# Patient Record
Sex: MALE | Race: WHITE | NOT HISPANIC OR LATINO | Employment: OTHER | URBAN - NONMETROPOLITAN AREA
[De-identification: names, ages, dates, MRNs, and addresses within clinical notes are randomized per-mention and may not be internally consistent; named-entity substitution may affect disease eponyms.]

---

## 2019-10-17 ENCOUNTER — OFFICE VISIT CONVERTED (OUTPATIENT)
Dept: FAMILY MEDICINE CLINIC | Age: 54
End: 2019-10-17
Attending: FAMILY MEDICINE

## 2019-10-17 ENCOUNTER — HOSPITAL ENCOUNTER (OUTPATIENT)
Dept: OTHER | Facility: HOSPITAL | Age: 54
Discharge: HOME | End: 2019-10-17
Attending: FAMILY MEDICINE

## 2019-10-17 LAB
ALBUMIN SERPL-MCNC: 4.7 G/DL (ref 3.5–5)
ALBUMIN/GLOB SERPL: 2 {RATIO} (ref 1.4–2.6)
ALP SERPL-CCNC: 50 U/L (ref 56–119)
ALT SERPL-CCNC: 16 U/L (ref 10–40)
ANION GAP SERPL CALC-SCNC: 18 MMOL/L (ref 8–19)
AST SERPL-CCNC: 20 U/L (ref 15–50)
BASOPHILS # BLD MANUAL: 0.02 10*3/UL (ref 0–0.2)
BASOPHILS NFR BLD MANUAL: 0.4 % (ref 0–3)
BILIRUB SERPL-MCNC: 1.01 MG/DL (ref 0.2–1.3)
BUN SERPL-MCNC: 15 MG/DL (ref 5–25)
BUN/CREAT SERPL: 16 {RATIO} (ref 6–20)
CALCIUM SERPL-MCNC: 9.8 MG/DL (ref 8.7–10.4)
CHLORIDE SERPL-SCNC: 102 MMOL/L (ref 99–111)
CHOLEST SERPL-MCNC: 180 MG/DL (ref 107–200)
CHOLEST/HDLC SERPL: 2.5 {RATIO} (ref 3–6)
CONV CO2: 26 MMOL/L (ref 22–32)
CONV TOTAL PROTEIN: 7.1 G/DL (ref 6.3–8.2)
CREAT UR-MCNC: 0.96 MG/DL (ref 0.7–1.2)
DEPRECATED RDW RBC AUTO: 40.1 FL
EOSINOPHIL # BLD MANUAL: 0.11 10*3/UL (ref 0–0.7)
EOSINOPHIL NFR BLD MANUAL: 2.3 % (ref 0–7)
ERYTHROCYTE [DISTWIDTH] IN BLOOD BY AUTOMATED COUNT: 12.3 % (ref 11.5–14.5)
GFR SERPLBLD BASED ON 1.73 SQ M-ARVRAT: >60 ML/MIN/{1.73_M2}
GLOBULIN UR ELPH-MCNC: 2.4 G/DL (ref 2–3.5)
GLUCOSE SERPL-MCNC: 111 MG/DL (ref 70–99)
GRANS (ABSOLUTE): 3.24 10*3/UL (ref 2–8)
GRANS: 67.2 % (ref 30–85)
HBA1C MFR BLD: 14.7 G/DL (ref 14–18)
HCT VFR BLD AUTO: 43.8 % (ref 42–52)
HDLC SERPL-MCNC: 73 MG/DL (ref 40–60)
IMM GRANULOCYTES # BLD: 0 10*3/UL (ref 0–0.54)
IMM GRANULOCYTES NFR BLD: 0 % (ref 0–0.43)
LDLC SERPL CALC-MCNC: 96 MG/DL (ref 70–100)
LYMPHOCYTES # BLD MANUAL: 1.09 10*3/UL (ref 1–5)
LYMPHOCYTES NFR BLD MANUAL: 7.5 % (ref 3–10)
MCH RBC QN AUTO: 29.6 PG (ref 27–31)
MCHC RBC AUTO-ENTMCNC: 33.6 G/DL (ref 33–37)
MCV RBC AUTO: 88.1 FL (ref 80–96)
MONOCYTES # BLD AUTO: 0.36 10*3/UL (ref 0.2–1.2)
OSMOLALITY SERPL CALC.SUM OF ELEC: 294 MOSM/KG (ref 273–304)
PLATELET # BLD AUTO: 122 10*3/UL (ref 130–400)
PMV BLD AUTO: 11.2 FL (ref 7.4–10.4)
POTASSIUM SERPL-SCNC: 4.6 MMOL/L (ref 3.5–5.3)
RBC # BLD AUTO: 4.97 10*6/UL (ref 4.7–6.1)
SODIUM SERPL-SCNC: 141 MMOL/L (ref 135–147)
TRIGL SERPL-MCNC: 57 MG/DL (ref 40–150)
TSH SERPL-ACNC: 3.29 M[IU]/L (ref 0.27–4.2)
VARIANT LYMPHS NFR BLD MANUAL: 22.6 % (ref 20–45)
VLDLC SERPL-MCNC: 11 MG/DL (ref 5–37)
WBC # BLD AUTO: 4.82 10*3/UL (ref 4.8–10.8)

## 2019-10-18 LAB
EST. AVERAGE GLUCOSE BLD GHB EST-MCNC: 103 MG/DL
HBA1C MFR BLD: 5.2 % (ref 3.5–5.7)

## 2020-01-13 ENCOUNTER — HOSPITAL ENCOUNTER (OUTPATIENT)
Dept: OTHER | Facility: HOSPITAL | Age: 55
Discharge: HOME | End: 2020-01-13
Attending: FAMILY MEDICINE

## 2020-01-13 LAB
ERYTHROCYTE [DISTWIDTH] IN BLOOD BY AUTOMATED COUNT: 12.1 % (ref 11.6–14.4)
HCT VFR BLD AUTO: 42.7 % (ref 42–52)
HGB BLD-MCNC: 14.4 G/DL (ref 14–18)
MCH RBC QN AUTO: 29.4 PG (ref 27–31)
MCHC RBC AUTO-ENTMCNC: 33.7 G/DL (ref 33–37)
MCV RBC AUTO: 87.3 FL (ref 80–96)
PLATELET # BLD AUTO: 144 10*3/UL (ref 130–400)
PMV BLD AUTO: 11.6 FL (ref 9.4–12.4)
RBC # BLD AUTO: 4.89 10*6/UL (ref 4.7–6.1)
WBC # BLD AUTO: 6.24 10*3/UL (ref 4.8–10.8)

## 2020-04-21 ENCOUNTER — OFFICE VISIT CONVERTED (OUTPATIENT)
Dept: FAMILY MEDICINE CLINIC | Age: 55
End: 2020-04-21
Attending: FAMILY MEDICINE

## 2020-04-22 ENCOUNTER — HOSPITAL ENCOUNTER (OUTPATIENT)
Dept: OTHER | Facility: HOSPITAL | Age: 55
Discharge: HOME | End: 2020-04-22
Attending: FAMILY MEDICINE

## 2020-04-22 LAB
ALBUMIN SERPL-MCNC: 4.5 G/DL (ref 3.5–5)
ALBUMIN/GLOB SERPL: 1.8 {RATIO} (ref 1.4–2.6)
ALP SERPL-CCNC: 57 U/L (ref 56–119)
ALT SERPL-CCNC: 20 U/L (ref 10–40)
ANION GAP SERPL CALC-SCNC: 18 MMOL/L (ref 8–19)
AST SERPL-CCNC: 20 U/L (ref 15–50)
BILIRUB SERPL-MCNC: 0.46 MG/DL (ref 0.2–1.3)
BUN SERPL-MCNC: 14 MG/DL (ref 5–25)
BUN/CREAT SERPL: 12 {RATIO} (ref 6–20)
CALCIUM SERPL-MCNC: 10.2 MG/DL (ref 8.7–10.4)
CHLORIDE SERPL-SCNC: 101 MMOL/L (ref 99–111)
CONV CO2: 27 MMOL/L (ref 22–32)
CONV TOTAL PROTEIN: 7 G/DL (ref 6.3–8.2)
CREAT UR-MCNC: 1.14 MG/DL (ref 0.7–1.2)
ERYTHROCYTE [DISTWIDTH] IN BLOOD BY AUTOMATED COUNT: 11.9 % (ref 11.5–14.5)
GFR SERPLBLD BASED ON 1.73 SQ M-ARVRAT: >60 ML/MIN/{1.73_M2}
GLOBULIN UR ELPH-MCNC: 2.5 G/DL (ref 2–3.5)
GLUCOSE SERPL-MCNC: 111 MG/DL (ref 70–99)
HBA1C MFR BLD: 15 G/DL (ref 14–18)
HCT VFR BLD AUTO: 44.5 % (ref 42–52)
MAGNESIUM SERPL-MCNC: 1.88 MG/DL (ref 1.6–2.3)
MCH RBC QN AUTO: 29.6 PG (ref 27–31)
MCHC RBC AUTO-ENTMCNC: 33.7 G/DL (ref 33–37)
MCV RBC AUTO: 87.9 FL (ref 80–96)
OSMOLALITY SERPL CALC.SUM OF ELEC: 293 MOSM/KG (ref 273–304)
PLATELET # BLD AUTO: 139 10*3/UL (ref 130–400)
PMV BLD AUTO: 10.6 FL (ref 7.4–10.4)
POTASSIUM SERPL-SCNC: 4.7 MMOL/L (ref 3.5–5.3)
RBC # BLD AUTO: 5.06 10*6/UL (ref 4.7–6.1)
SODIUM SERPL-SCNC: 141 MMOL/L (ref 135–147)
TSH SERPL-ACNC: 3.53 M[IU]/L (ref 0.27–4.2)
WBC # BLD AUTO: 5.11 10*3/UL (ref 4.8–10.8)

## 2020-04-24 ENCOUNTER — CONVERSION ENCOUNTER (OUTPATIENT)
Dept: CARDIOLOGY | Facility: CLINIC | Age: 55
End: 2020-04-24
Attending: SPECIALIST

## 2020-05-20 ENCOUNTER — CONVERSION ENCOUNTER (OUTPATIENT)
Dept: OTHER | Facility: HOSPITAL | Age: 55
End: 2020-05-20

## 2020-05-20 ENCOUNTER — OFFICE VISIT CONVERTED (OUTPATIENT)
Dept: CARDIOLOGY | Facility: CLINIC | Age: 55
End: 2020-05-20
Attending: INTERNAL MEDICINE

## 2020-05-28 ENCOUNTER — CONVERSION ENCOUNTER (OUTPATIENT)
Dept: CARDIOLOGY | Facility: CLINIC | Age: 55
End: 2020-05-28
Attending: INTERNAL MEDICINE

## 2020-07-08 ENCOUNTER — CONVERSION ENCOUNTER (OUTPATIENT)
Dept: CARDIOLOGY | Facility: CLINIC | Age: 55
End: 2020-07-08
Attending: INTERNAL MEDICINE

## 2020-08-27 ENCOUNTER — HOSPITAL ENCOUNTER (OUTPATIENT)
Dept: OTHER | Facility: HOSPITAL | Age: 55
Discharge: HOME | End: 2020-08-27
Attending: FAMILY MEDICINE

## 2020-08-27 ENCOUNTER — OFFICE VISIT CONVERTED (OUTPATIENT)
Dept: FAMILY MEDICINE CLINIC | Age: 55
End: 2020-08-27
Attending: FAMILY MEDICINE

## 2020-09-10 ENCOUNTER — HOSPITAL ENCOUNTER (OUTPATIENT)
Dept: PHYSICAL THERAPY | Facility: CLINIC | Age: 55
Setting detail: RECURRING SERIES
Discharge: HOME | End: 2020-10-13
Attending: FAMILY MEDICINE

## 2021-04-12 ENCOUNTER — OFFICE VISIT CONVERTED (OUTPATIENT)
Dept: FAMILY MEDICINE CLINIC | Age: 56
End: 2021-04-12
Attending: FAMILY MEDICINE

## 2021-04-14 ENCOUNTER — HOSPITAL ENCOUNTER (OUTPATIENT)
Dept: OTHER | Facility: HOSPITAL | Age: 56
Discharge: HOME | End: 2021-04-14
Attending: FAMILY MEDICINE

## 2021-04-14 LAB
ALBUMIN SERPL-MCNC: 4.4 G/DL (ref 3.5–5)
ALBUMIN/GLOB SERPL: 1.7 {RATIO} (ref 1.4–2.6)
ALP SERPL-CCNC: 53 U/L (ref 56–119)
ALT SERPL-CCNC: 22 U/L (ref 10–40)
ANION GAP SERPL CALC-SCNC: 17 MMOL/L (ref 8–19)
AST SERPL-CCNC: 22 U/L (ref 15–50)
BASOPHILS # BLD MANUAL: 0.01 10*3/UL (ref 0–0.2)
BASOPHILS NFR BLD MANUAL: 0.2 % (ref 0–3)
BILIRUB SERPL-MCNC: 0.77 MG/DL (ref 0.2–1.3)
BUN SERPL-MCNC: 17 MG/DL (ref 5–25)
BUN/CREAT SERPL: 17 {RATIO} (ref 6–20)
CALCIUM SERPL-MCNC: 9.4 MG/DL (ref 8.7–10.4)
CHLORIDE SERPL-SCNC: 101 MMOL/L (ref 99–111)
CHOLEST SERPL-MCNC: 206 MG/DL (ref 107–200)
CHOLEST/HDLC SERPL: 2.8 {RATIO} (ref 3–6)
CONV CO2: 25 MMOL/L (ref 22–32)
CONV TOTAL PROTEIN: 7 G/DL (ref 6.3–8.2)
CREAT UR-MCNC: 0.99 MG/DL (ref 0.7–1.2)
DEPRECATED RDW RBC AUTO: 40 FL
EOSINOPHIL # BLD MANUAL: 0.15 10*3/UL (ref 0–0.7)
EOSINOPHIL NFR BLD MANUAL: 3 % (ref 0–7)
ERYTHROCYTE [DISTWIDTH] IN BLOOD BY AUTOMATED COUNT: 12.4 % (ref 11.5–14.5)
GFR SERPLBLD BASED ON 1.73 SQ M-ARVRAT: >60 ML/MIN/{1.73_M2}
GLOBULIN UR ELPH-MCNC: 2.6 G/DL (ref 2–3.5)
GLUCOSE SERPL-MCNC: 107 MG/DL (ref 70–99)
GRANS (ABSOLUTE): 3 10*3/UL (ref 2–8)
GRANS: 59.5 % (ref 30–85)
HBA1C MFR BLD: 15.3 G/DL (ref 14–18)
HCT VFR BLD AUTO: 45 % (ref 42–52)
HDLC SERPL-MCNC: 73 MG/DL (ref 40–60)
IMM GRANULOCYTES # BLD: 0 10*3/UL (ref 0–0.54)
IMM GRANULOCYTES NFR BLD: 0 % (ref 0–0.43)
LDLC SERPL CALC-MCNC: 123 MG/DL (ref 70–100)
LYMPHOCYTES # BLD MANUAL: 1.46 10*3/UL (ref 1–5)
LYMPHOCYTES NFR BLD MANUAL: 8.3 % (ref 3–10)
MCH RBC QN AUTO: 30.1 PG (ref 27–31)
MCHC RBC AUTO-ENTMCNC: 34 G/DL (ref 33–37)
MCV RBC AUTO: 88.6 FL (ref 80–96)
MONOCYTES # BLD AUTO: 0.42 10*3/UL (ref 0.2–1.2)
OSMOLALITY SERPL CALC.SUM OF ELEC: 290 MOSM/KG (ref 273–304)
PLATELET # BLD AUTO: 137 10*3/UL (ref 130–400)
PMV BLD AUTO: 11.1 FL (ref 7.4–10.4)
POTASSIUM SERPL-SCNC: 4.4 MMOL/L (ref 3.5–5.3)
RBC # BLD AUTO: 5.08 10*6/UL (ref 4.7–6.1)
SODIUM SERPL-SCNC: 139 MMOL/L (ref 135–147)
TRIGL SERPL-MCNC: 52 MG/DL (ref 40–150)
TSH SERPL-ACNC: 4.14 M[IU]/L (ref 0.27–4.2)
VARIANT LYMPHS NFR BLD MANUAL: 29 % (ref 20–45)
VLDLC SERPL-MCNC: 10 MG/DL (ref 5–37)
WBC # BLD AUTO: 5.04 10*3/UL (ref 4.8–10.8)

## 2021-04-15 LAB
EST. AVERAGE GLUCOSE BLD GHB EST-MCNC: 108 MG/DL
HBA1C MFR BLD: 5.4 % (ref 3.5–5.7)

## 2021-04-21 ENCOUNTER — HOSPITAL ENCOUNTER (OUTPATIENT)
Dept: OTHER | Facility: HOSPITAL | Age: 56
Discharge: HOME | End: 2021-04-21
Attending: FAMILY MEDICINE

## 2021-05-10 NOTE — PROCEDURES
"   Procedure Note      Patient Name: John Shields   Patient ID: [id number]   Sex: Male   YOB: 1965    Primary Care Provider: Jose R Mayen MD   Referring Provider: Jose R Mayen MD    Visit Date: May 28, 2020    Provider: Rigoberto Lang MD   Location: [facility]   Location Address: [address] [place] [address], [address]   IRINEO Johnson  [id number]   Location Phone: [phone]          FINAL REPORT   TRANSTHORACIC ECHOCARDIOGRAM REPORT    Diagnosis: Palpitations   Height: 6'0\" Weight: 187 B/P: BLOOD PRESSURE BSA: 2.07   Tech: JLW   MEASUREMENTS:  RVID (Diastole) : RVID. (NORMAL: 0.7 to 2.4 cm max)   LVID (Systole): 3 cm (Diastole): 4.5 cm . (NORMAL: 3.7 - 5.4 cm)   Posterior Wall Thickness (Diastole): 0.9 cm. (NORMAL: 0.8 - 1.1 cm)   Septal Thickness (Diastole): 0.9 cm. (NORMAL: 0.7 - 1.2 cm)   LAID (Systole): 3.3 cm. (NORMAL: 1.9 - 3.8 cm)   Aortic Root Diameter (Diastole): 2.9 cm. (NORMAL: 2.0 - 3.7 cm)   COMMENTS:  The patient underwent 2-D, M-Mode, and Doppler examination, including pulse-wave, continuous-wave, and color-flow Doppler analysis; the study is technically adequate. The following findings were noted:   FINDINGS:  MITRAL VALVE: Normal in appearance, opens well. No evidence of mitral valve prolapse. No mitral stenosis. Trace mitral regurgitation.   AORTIC VALVE: Normal trileaflet aortic valve, opens well. No evidence of aortic stenosis or regurgitation on Doppler examination.   TRICUSPID VALVE: Normal in appearance, opens well. Trace tricuspid regurgitation. Normal pulmonary artery systolic pressure.   PULMONIC VALVE: Grossly normal.   AORTIC ROOT: Normal in size with adequate motion.   LEFT ATRIUM: Normal in size. No intracavitary masses or clots seen. LA volume index is 24 mL/m2.   LEFT VENTRICLE: The left ventricular chamber size is normal. The left ventricular wall thickness is normal. Left ventricular systolic function is normal. Estimated LV ejection fraction is 55 " to 60%. There are no regional wall motion abnormalities.   RIGHT VENTRICLE: Normal size and function.   RIGHT ATRIUM: Normal in size.   PERICARDIUM: No evidence of pericardial effusion.   INFERIOR VENA CAVA: Measures 1.5 cm in diameter and there is more than 50% collapse during inspiration.   DOPPLER: E/A ratio is 1. DT= 204 msec. IVRT is 106 msec. E/E' is 10.   Faxed: 05/29/2020      CONCLUSION:  1.  Normal left ventricular systolic function with estimated LV ejection fraction of 55 to 60%.   2.  No hemodynamically significant valvular abnormalities.      MD NESTOR Cui/moises    This note was transcribed by Trinity Good.  moises/nestor  The above service was transcribed by Trinity Good, and I attest to the accuracy of the note.  NESTOR                 Electronically Signed by: Eloisa Good-, Other -Author on May 29, 2020 03:29:40 PM  Electronically Co-signed by: Rigoberto Lang MD -Reviewer on May 29, 2020 04:16:58 PM

## 2021-05-10 NOTE — H&P
History and Physical      Patient Name: John Shields   Patient ID: [id number]   Sex: Male   YOB: 1965    Primary Care Provider: Jose R Mayen MD   Referring Provider: Jose R Mayen MD    Visit Date: May 20, 2020    Provider: Rigoberto Lang MD   Location: [facility]   Location Address: [address] [place] [address] [facility]  [address] [place] [address]  [id number]   Location Phone: [phone]          Chief Complaint  · REASON FOR CONSULTATION: Palpitations       History Of Present Illness  Consult requested by: Jose R Mayen MD   John Shields is a 55-year-old male with hypertension, gout and BPH, who was referred for palpitations. The patient reports having 2 major episodes of palpitations. The first one was 2 months back, which woke him up in the middle of the night. Noted to have tachycardia with a heart rate in the 130s or 140s by manual counting. The symptoms lasted for 15 to 20 minutes, and he was diaphoretic. Subsequently he was seen in the PCP's office the same week. A 24-hour Holter Monitor study did not show any significant arrhythmias. He had a second episode 2 weeks back after having heavy exertion during the day. He checked his blood pressure and heart rate, and heart rate was 164 with normal blood pressure. Symptoms lasted for more than one hour and eventually subsided by itself. He did not seek medical help at that time. In between, he has minor episodes of fluttering in the chest, which can last for 3 to 4 seconds. He has no chest pain. No shortness of breath. No dizziness or syncopal episodes. He is fairly active at baseline.   PAST MEDICAL HISTORY: (1) Hypertension. (2) Gout. (3) BPH. (4) Negative for diabetes mellitus.   PSYCHOSOCIAL HISTORY: Denies tobacco use. Reports moderate alcohol consumption. No recreational drug usage. No mood changes.   FAMILY HISTORY: was reviewed. No family history of premature coronary artery disease.   CURRENT MEDICATIONS: include  Lisinopril 20 mg daily; Tamsulosin 0.4 mg b.i.d; Indomethacin 50 mg t.i.d. The dosage and frequency of the medications were reviewed with the patient.   ALLERGIES: No known drug allergies.       Review of Systems  · Constitutional  o Admits  o : good general health lately  o Denies  o : fatigue, recent weight changes   · Eyes  o Admits  o : blurred vision  o Denies  o : double vision  · HENT  o Admits  o : hearing loss or ringing  o Denies  o : chronic sinus problem, swollen glands in neck  · Cardiovascular  o Admits  o : palpitations (fast, fluttering, or skipping beats)  o Denies  o : chest pain, swelling (feet, ankles, hands), shortness of breath while walking or lying flat  · Respiratory  o Denies  o : chronic or frequent cough, asthma or wheezing, COPD  · Gastrointestinal  o Denies  o : ulcers, nausea or vomiting  · Neurologic  o Admits  o : lightheaded or dizzy  o Denies  o : stroke, headaches  · Musculoskeletal  o Admits  o : back pain  o Denies  o : joint pain  · Endocrine  o Denies  o : thyroid disease, diabetes, heat or cold intolerance, excessive thirst or urination  · Heme-Lymph  o Denies  o : bleeding or bruising tendency, anemia      Vitals  Date Time BP Position Site L\R Cuff Size HR RR TEMP (F) WT  HT  BMI kg/m2 BSA m2 O2 Sat HC       05/20/2020 12:33 /87 Sitting    54 - R   187lbs 0oz 6'   25.36 2.08     05/20/2020 12:33 /95 Sitting    54 - R                 Physical Examination  · Constitutional  o Appearance  o : Awake, alert, in no acute distress.  · Head and Face  o HEENT  o : No pallor, anicteric. Eyes normal. Moist mucous membranes.  · Neck  o Inspection/Palpation  o : Supple. No hepatosplenomegaly.  o Jugular Veins  o : No JVD. No carotid bruits.  · Respiratory  o Auscultation of Lungs  o : Clear to auscultation bilaterally. No crackles or wheezing.  · Cardiovascular  o Heart  o : S1, S2 is normally heard. No S3. No murmur, rubs, or gallops.  · Gastrointestinal  o Abdominal  Examination  o : Soft, non-distended. No palpable hepatosplenomegaly. Bowel sounds heard in all four quadrants.  · Musculoskeletal  o General  o : Normal muscle tone and strength.  · Skin and Subcutaneous Tissue  o General Inspection  o : No skin rashes.  · Extremities  o Extremities  o : Warm and well perfused. Distal pulses present. No pitting pedal edema.     EKG done on 04/20/2020 showed normal sinus rhythm, normal axis.  No significant ST-T changes.  Normal EKG.    24-hour Holter Monitor study done on 04/24 showed occasional PACs and PVCs with sinus tachycardia.    Labs done on 04/22 showed hemoglobin of 15, hematocrit 44.5, WBC 6.24, platelet count 139.  Sodium 141, potassium 4.7, BUN 14, creatinine 1.14.  Magnesium 1.88.  TSH is 3.53.           Assessment     ASSESSMENT AND PLAN:    1.  Palpitations:  Two significant episodes.  Per description, these are episodes of paroxysmal supraventricular        tachycardia.  However, a 24-hour Holter Monitor study did not show any arrhythmias, and the patient did       not have any symptoms.  Will need longer-term monitoring.  Will proceed with a 1-month Event Monitor       study.  Will also check an echocardiogram to assess the left ventricular function and to rule out any       significant valvular abnormalities.  Recent labs showed normal thyroid function.  2.  Follow up with echocardiogram and Event Monitor reports.    MD NESOTR Cui/kleber           This note was transcribed by Fatoumata Boothe.  kleber/NESTOR  The above service was transcribed by Fatoumata Boothe, and I attest to the accuracy of the note.  NESTOR                  Electronically Signed by: Fatoumata Boothe-, -Author on May 22, 2020 06:23:10 AM  Electronically Co-signed by: Rigoberto Lang MD -Reviewer on May 22, 2020 09:05:58 AM

## 2021-05-10 NOTE — PROCEDURES
Procedure Note      Patient Name: John Shields   Patient ID: [id number]   Sex: Male   YOB: 1965    Primary Care Provider: Jose R Mayen MD   Referring Provider: Jose R Mayen MD    Visit Date: July 8, 2020    Provider: Rigoberto Lang MD   Location: [facility]   Location Address: [address] [place] [address], [facility]   [place], [place]  [id number]   Location Phone: [phone]          FINAL REPORT   CARDIAC MONITOR REPORT     ONE MONTH CARDIAC EVENT MONITOR       INDICATION:  Palpitations.    The patient was monitored for a period of 30 days starting from 05/27/2020 to 06/25/2020 using a [employer] cardiac event monitor.  The underlying rhythm was noted to be normal sinus rhythm with heart rates varying from 36 beats per minute to 154 beats per minute, average being 63 beats per minute.  The fastest heart rate of 154 beats per minute was documented at 8:42 PM on 06/19/2020 in sinus tachycardia.  There was another tachycardia episode at 10:02 PM on 06/05/2020 at a heart rate of 149 beats per minute.  All the bradycardia episodes happened during presumed sleep.  The slowest heart rate of 36 beats per minute was documented at 1:22 AM on 05/28/2020 in sinus bradycardia.  There no conduction blocks or long pauses noted.  The patient did not report any symptoms during the study period.  Adherence to monitoring was 99% which makes the study valid.     CONCLUSION:  One month cardiac event monitor study showing episodes of sinus tachycardia.  No sustained arrhythmias detected.       MD NESTOR Cui/moises    This note was transcribed by Trinity Good.  moises/nestor  The above service was transcribed by Trinity Good, and I attest to the accuracy of the note.  NESTOR                 Electronically Signed by: Eloisa Good-, Other -Author on July 8, 2020 03:17:22 PM  Electronically Co-signed by: Rigoberto Lang MD -Reviewer on July 8, 2020 04:59:38 PM

## 2021-05-10 NOTE — PROCEDURES
Procedure Note      Patient Name: John Shields   Patient ID: [id number]   Sex: Male   YOB: 1965    Primary Care Provider: Jose R Mayen MD   Referring Provider: Jose R Mayen MD    Visit Date: April 24, 2020    Provider: Scott Salas MD   Location: [facility]   Location Address: [address] [place] [address] [address]   [place], [place]  [id number]   Location Phone: [phone]          FINAL REPORT   HOLTER MONITOR REPORT  Date: 04/22/2020   Indication: Tachycardia      The patient was monitored for a period of 24 hours.  The total number of beats was 88,667 with an average rate of 63.  The maximum beats per minute was 125 with a minimum beats per minute of 46. There were occasional PACs and PVCs with periods of sinus tachycardia.      CONCLUSION:  24-hour Holter monitor reveals occasional PACs and PVCs with periods of sinus tac.      MD NIKKY Matthews/moises    This note was transcribed by Trinity Good.  pap/NIKKY  The above service was transcribed by Trinity Good, and I attest to the accuracy of the note.  NIKKY                 Electronically Signed by: Eloisa Good-, Other -Author on April 27, 2020 09:30:08 AM  Electronically Co-signed by: Scott Salas MD -Reviewer on April 29, 2020 09:23:12 AM

## 2021-05-15 VITALS
HEART RATE: 54 BPM | BODY MASS INDEX: 25.33 KG/M2 | HEIGHT: 72 IN | SYSTOLIC BLOOD PRESSURE: 162 MMHG | WEIGHT: 187 LBS | DIASTOLIC BLOOD PRESSURE: 87 MMHG

## 2021-05-18 NOTE — PROGRESS NOTES
John Shields  1965     Office/Outpatient Visit    Visit Date: Thu, Aug 27, 2020 09:34 am    Provider: Jose R Mayen MD (Assistant: Beverly Goodwin RN)    Location: [facility]        Electronically signed by Jose R Mayen MD on  08/28/2020 04:51:52 PM                             Subjective:        CC: 'I've been having some pain in the L hip'    HPI:       John has been struggling with pain in the L hip.  This has been present for the last 8 weeks.  He notes this with certain activities.  He is not aware of a specific injury that might explain this.  He feels fairly well otherwise.  He is not really having any pain today of which he is aware.  When he does have pain, it will bother him all day.  He is not currently taking anything for this.  He has taken ibuprofen a couple of times.    ROS:     CONSTITUTIONAL:  Negative for chills and fever.      CARDIOVASCULAR:  Negative for chest pain and palpitations.      RESPIRATORY:  Negative for recent cough and dyspnea.      GASTROINTESTINAL:  Negative for abdominal pain, nausea and vomiting.      INTEGUMENTARY:  Negative for atypical mole(s) and rash.          Past Medical History / Family History / Social History:         Last Reviewed on 4/21/2020 04:45 PM by Jose R Mayen    Past Medical History:             PAST MEDICAL HISTORY         Hypertension     Benign Prostatic Hypertrophy         Surgical History:     NONE         Family History:     Father: Rare Spinal Tumor     Mother: Hypertension         Social History:     Occupation: Retired (Prior occupation: [employer])     Marital Status:      Children: 3 children         Tobacco/Alcohol/Supplements:     Last Reviewed on 4/21/2020 04:45 PM by Jose R Mayen    Tobacco:  Nonsmoker (never smoked);         Alcohol:    Drinks alcohol on a regular basis.      Caffeine:  He admits to consuming caffeine via coffee ( 2 servings per day ).          Substance Abuse  History:     Last Reviewed on 4/21/2020 04:45 PM by Jose R Mayen        Mental Health History:     Last Reviewed on 4/21/2020 04:45 PM by Jose R Mayen        Communicable Diseases (eg STDs):     Last Reviewed on 4/21/2020 04:45 PM by Jose R Mayen        Current Problems:     Last Reviewed on 4/21/2020 04:45 PM by Jose R Mayen    Thrombocytopenia, unspecified    Tachycardia, unspecified    Encounter for immunization        Immunizations:     Hep A, adult (VAQTA) 4/23/2020    Hep A, adult dose 10/17/2019    Fluzone pf (3+ years dose) 10/17/2019        Allergies:     Last Reviewed on 4/21/2020 04:45 PM by Jose R Mayen    No Known Allergies.        Current Medications:     Last Reviewed on 8/27/2020 09:35 AM by Beverly Goodwin    Lisinopril 20 mg oral tablet [1 q day]    tamsulosin 0.4 mg oral capsule [1 capsule bid ]    indomethacin 50 mg oral capsule [take 1 capsule tid]        Objective:        Vitals:         Current: 8/27/2020 9:37:34 AM    Ht:  6 ft, 0 in;  Wt: 186.4 lbs;  BMI: 25.3T: 97.4 F (temporal);  BP: 117/76 mm Hg (right arm, sitting);  P: 66 bpm (right arm (BP Cuff), sitting);  sCr: 1.14 mg/dL;  GFR: 71.71        Exams:     PHYSICAL EXAM:     GENERAL: Vitals recorded well developed, well nourished;     NECK: range of motion is normal; thyroid is non-palpable;     RESPIRATORY: normal respiratory rate and pattern with no distress; normal breath sounds with no rales, rhonchi, wheezes or rubs;     CARDIOVASCULAR: normal rate; rhythm is regular;  no systolic murmur;     GASTROINTESTINAL: nontender; normal bowel sounds; no masses;     LYMPHATIC: no enlargement of cervical or facial nodes; no supraclavicular nodes;     SKIN:  no significant rashes or lesions; no suspicious moles;     MUSCULOSKELETAL: there is preserved ROM in the L hip - no groin mass is apparent - he has no pain with movement;     NEUROLOGIC: mental status: alert and oriented x 3; cranial nerves  II-XII grossly intact;     PSYCHIATRIC: appropriate affect and demeanor; normal psychomotor function;         Assessment:         M25.552   Pain in left hip           ORDERS:         Radiology/Test Orders:       [id number]  Left radiologic exam, hip, unilateral; complete, minimum of two views  (Send-Out)              Other Orders:         Depression screen negative  (In-House)                      Plan:         Pain in left hip        RADIOLOGY:  I have ordered a left hip x-ray to be done today.      RECOMMENDATIONS given include: Today, we have reviewed his care.  His exam is reassuring.  However, he has had this consistent pain.  We will get X-ray and consider how to move forward.  I might want to get PT evaluation.  Consider MRI and/or ortho referral..  MIPS PHQ-9 Depression Screening: Completed form scanned and in chart; Total Score 1; Negative Depression Screen           Orders:       [id number]  Left radiologic exam, hip, unilateral; complete, minimum of two views  (Send-Out)              Depression screen negative  (In-House)                  Charge Capture:         Primary Diagnosis:     M25.552  Pain in left hip           Orders:      [id number]  Office/outpatient visit; established patient, level 3  (In-House)              Depression screen negative  (In-House)

## 2021-05-18 NOTE — PROGRESS NOTES
John Shields  1965     Office/Outpatient Visit    Visit Date: Tue, Apr 21, 2020 04:02 pm    Provider: Jose R Mayen MD (Assistant: Sloane Leary MA)    Location: [facility]        Electronically signed by Jose R Mayen MD on  04/21/2020 06:43:00 PM                             Subjective:        CC: CONSENT BY MNP-    VIDEO/ DOXIMITY [id number](PT IS FINISHED WITH INDOMETHACIN)Mr. Shields is a 55 year old White male.  Pt has had a few episodes of tachycardia         TELEMEDICINE VISIT:    - John consented to this telemedicine visit.    - Persons present during the telemedicine consultation include:  John - patient, Dr. Mayen    - This visit is being conducted over FaceTime with audio and video.        HPI:       John is being seen today for evaluation of tachycardia.  He says that he was working in the yard yesterday.  He over exerted himself.  He says that he just felt exhausted and was somewhat light-headed.  He had not eaten since about 6:00am.  He says that he felt off balance.  He came in and rested in the recliner and noted that his heart rate was around 160.  He did not have any chest pain or shortness of breath.  He did not really feel hot.  He was sweating, but he did not feel really hot.  He has never had any kind of heat related issue.  He says that he did wake up about a week ago and noted that his heart rate was elevated.  It was beating more quickly than he would expect.  He says that persisted for about 20 minutes and then resolved.  The rapid heart beat actually woke him up.    ROS:     CONSTITUTIONAL:  Negative for chills and fever.      CARDIOVASCULAR:  Positive for tachycardia.   Negative for chest pain or palpitations.      RESPIRATORY:  Negative for recent cough and dyspnea.      GASTROINTESTINAL:  Negative for abdominal pain, nausea and vomiting.      INTEGUMENTARY:  Negative for atypical mole(s) and rash.          Past Medical History / Family History /  Social History:         Last Reviewed on 4/21/2020 04:45 PM by Jose R Mayen    Past Medical History:             PAST MEDICAL HISTORY         Hypertension     Benign Prostatic Hypertrophy         Surgical History:     NONE         Family History:     Father: Rare Spinal Tumor     Mother: Hypertension         Social History:     Occupation: Retired (Prior occupation: [employer])     Marital Status:      Children: 3 children         Tobacco/Alcohol/Supplements:     Last Reviewed on 4/21/2020 04:45 PM by Jose R Mayen    Tobacco:  Nonsmoker (never smoked);         Alcohol:    Drinks alcohol on a regular basis.      Caffeine:  He admits to consuming caffeine via coffee ( 2 servings per day ).          Substance Abuse History:     Last Reviewed on 4/21/2020 04:45 PM by Jose R Mayen        Mental Health History:     Last Reviewed on 4/21/2020 04:45 PM by Jose R Mayen        Communicable Diseases (eg STDs):     Last Reviewed on 4/21/2020 04:45 PM by Jose R Mayen        Current Problems:     Last Reviewed on 4/21/2020 04:45 PM by Jose R Mayen    Thrombocytopenia, unspecified    Tachycardia, unspecified        Immunizations:     Hep A, adult dose 10/17/2019    Fluzone pf (3+ years dose) 10/17/2019        Allergies:     Last Reviewed on 4/21/2020 04:45 PM by Jose R Mayen    No Known Allergies.        Current Medications:     Last Reviewed on 4/21/2020 04:45 PM by Jose R Mayen    Lisinopril 20 mg oral tablet [1 q day]    tamsulosin 0.4 mg oral capsule [1 capsule bid ]    indomethacin 50 mg oral capsule [take 1 capsule tid]        Objective:        Vitals:         Current: 4/21/2020 4:17:50 PM    Ht:  6 ft, 0 inBP: 111/72 mm Hg (right arm, sitting);  P: 72 bpm (left arm (BP Cuff), sitting);  sCr: 0.96 mg/dL;  GFR: 71.25        Repeat:     4:18:1 PM  BP:   117/74mm Hg (66) 4:18:23 PM  BP:   127/85mm Hg (58)     Exams:     PHYSICAL EXAM:      GENERAL: Vitals recorded well developed, well nourished;     EYES: extraocular movements intact; conjunctiva and cornea are normal;     RESPIRATORY: normal respiratory rate and pattern with no distress;     NEUROLOGIC: mental status: alert and oriented x 3;     PSYCHIATRIC: appropriate affect and demeanor;         Assessment:         R00.0   Tachycardia, unspecified           ORDERS:         Radiology/Test Orders:       [id number]  Electrocardiogram, routine with at least 12 leads; with interpretation and report  (Send-Out)            [id number]  Holter monitor connection and disconnection  (Send-Out)              Lab Orders:       [id number]  BDCB2 - [facility] CBC w/o diff  (Send-Out)            [id number]  COMP - [facility] Comp. Metabolic Panel  (Send-Out)            91434  MG - [facility] Magnesium, Serum  (Send-Out)            [id number]  TSH - [facility] TSH  (Send-Out)                      Plan:         Tachycardia, unspecified    LABORATORY:  Labs ordered to be performed today include CBC W/O DIFF, Comprehensive metabolic panel, Magnesium level, and TSH.      TESTS/PROCEDURES:  Will proceed with an ECG and Holter Monitor 24 hour to be performed/scheduled now.      RECOMMENDATIONS given include: I'm concerned about his symptoms.  I'm going to recommend getting additional testing as noted below.  This concerns me.  I suspect he has some kind of SVT or possibly a fib in play.  We will see what his testing shows.  I'm likely to put him on a low dose of beta blockade after reviewing his EKG.  He will stop in tomorrow for that testing..            Orders:       [id number]  BDCB2 - [facility] CBC w/o diff  (Send-Out)            95981  COMP - [facility] Comp. Metabolic Panel  (Send-Out)            86650  MG - [facility] Magnesium, Serum  (Send-Out)            22931  TSH - [facility] TSH  (Send-Out)            [id number]  Electrocardiogram, routine with at least 12 leads; with interpretation and report  (Send-Out)            53395  Holter monitor connection and disconnection  (Send-Out)                   Charge Capture:         Primary Diagnosis:     R00.0  Tachycardia, unspecified           Orders:      [id number]  Office/outpatient visit; established patient, level 4  (In-House)

## 2021-05-18 NOTE — PROGRESS NOTES
John Shields 1965     Office/Outpatient Visit    Visit Date: Thu, Oct 17, 2019 10:17 am    Provider: Jose R Mayen MD (Assistant: Beverly Goodwin RN)    Location: [facility]        Electronically signed by Jose R Mayen MD on  10/18/2019 04:02:23 AM                             SUBJECTIVE:        CC: physical exam         HPI:     John is in today for wellness exam.  He is 54 years old and is retired from [employer].  He does not smoke.  He uses some alcohol - beer.  He will mostly have some when he eats out.  He has had colonoscopy at age 48 which was apparently normal.  He is not sure exactly where that was done.  He is up to date on prostate check.     ROS:     CONSTITUTIONAL:  Negative for chills and fever.      CARDIOVASCULAR:  Negative for chest pain and palpitations.      RESPIRATORY:  Negative for recent cough and dyspnea.      GASTROINTESTINAL:  Negative for abdominal pain, nausea and vomiting.      INTEGUMENTARY:  Negative for atypical mole(s) and rash.          PMH/FMH/SH:     Last Reviewed on 10/17/2019 10:51 AM by Jose R Mayen    Past Medical History:             PAST MEDICAL HISTORY         Hypertension     Benign Prostatic Hypertrophy         Surgical History:     NONE         Family History:     Father: Rare Spinal Tumor     Mother: Hypertension         Social History:     Occupation: Retired (Prior occupation: [employer])     Marital Status:      Children: 3 children         Tobacco/Alcohol/Supplements:     Last Reviewed on 10/17/2019 10:51 AM by Jose R Mayen    Tobacco:  Nonsmoker (never smoked);         Alcohol:    Drinks alcohol on a regular basis.      Caffeine:  He admits to consuming caffeine via coffee ( 2 servings per day ).          Substance Abuse History:     Last Reviewed on 10/17/2019 10:51 AM by Jose R Mayen        Mental Health History:     Last Reviewed on 10/17/2019 10:51 AM by Jose R Mayen         Communicable Diseases (eg STDs):     Last Reviewed on 10/17/2019 10:51 AM by Jose R Mayen            Current Problems:     Last Reviewed on 10/17/2019 10:51 AM by Jose R Mayen    HTN         Immunizations:     None        Allergies:     Last Reviewed on 10/17/2019 10:51 AM by Jose R Mayen      No Known Drug Allergies.         Current Medications:     Last Reviewed on 10/17/2019 10:51 AM by Jose R Mayen    Lisinopril 20mg Tablet 1 q day     Tamsulosin HCl 0.4mg Capsules 1 capsule bid         OBJECTIVE:        Vitals:         Current: 10/17/2019 10:22:31 AM    Ht:  6 ft, 0 in;  Wt: 183.6 lbs;  BMI: 24.9    T: 97.6 F (oral);  BP: 144/79 mm Hg (right arm, sitting);  P: 56 bpm (left arm (BP Cuff), sitting);  sCr: 1.1 mg/dL;  GFR: 74.70        Repeat:     11:01:13 AM     BP:   132/84mm Hg (right arm, sitting, pulse-51)         Exams:     PHYSICAL EXAM:     GENERAL: Vitals recorded well developed, well nourished;     EYES: extraocular movements intact; conjunctiva and cornea are normal; PERRL;     E/N/T: EARS:  normal external auditory canals and tympanic membranes;  grossly normal hearing; OROPHARYNX:  normal mucosa, dentition, gingiva, and posterior pharynx;     NECK: range of motion is normal; thyroid is non-palpable;     RESPIRATORY: normal respiratory rate and pattern with no distress; normal breath sounds with no rales, rhonchi, wheezes or rubs;     CARDIOVASCULAR: normal rate; rhythm is regular;  no systolic murmur;     GASTROINTESTINAL: nontender; normal bowel sounds; no masses;     LYMPHATIC: no enlargement of cervical or facial nodes; no supraclavicular nodes;     SKIN:  no significant rashes or lesions; no suspicious moles;     NEUROLOGIC: mental status: alert and oriented x 3; cranial nerves II-XII grossly intact;     PSYCHIATRIC: appropriate affect and demeanor; normal psychomotor function;         Procedures:     Vaccination against other viral diseases, Influenza      1. Influenza, seasonal PF (children 3 years to adult): 0.5 ml unit dose given IM in the right upper arm; administered by [facility] Regarding contraindications to an Influenza vaccine: Denies moderate/severe illness with/without fever; serious reaction to eggs, egg proteins, gentamicin, gelatin, arginine, neomycin or polymixin; serious reaction after recieving previous influenza vaccines; and history of Guillain-[place] Syndrome.          Vaccination against hepatitis A     1. Hepatitis A (adult): 1.0 ml given IM in the left upper arm; administered by [facility]             ASSESSMENT           V70.0   Z00.00  Yearly physical exam              DDx:     V04.81   Z23  Vaccination against other viral diseases, Influenza              DDx:     V05.3   Z23  Vaccination against hepatitis A              DDx:         ORDERS:         Lab Orders:       [id number]  [facility] PHYSICAL: CMP, CBC, TSH, LIPID: [id number], [id number], [id number], 29076  (Send-Out)           Procedures Ordered:       05321  Immunization administration; one vaccine  (In-House)         21562  Immunization administration; each additional vaccine/toxoid  (In-House)         08113  HepA vaccine adult dose for intramuscular use  (In-House)           Other Orders:       45339  Influenza virus vaccine, quadrivalent, split virus, preservative free 3 years of age & older  (In-House)           Depression screen negative  (In-House)                   PLAN:          Yearly physical exam     LABORATORY:  Labs ordered to be performed today include PHYSICAL PANEL; CMP, CBC, TSH, LIPID.      RECOMMENDATIONS given include: John seems to be doing well at this time.  I'm going to recommend no changes for now.  We will repeat his blood pressure prior to leaving and arrange labs.  We will follow from there..  [facility] PHQ-9 Depression Screening: Completed form scanned and in chart; Total Score 1; Negative Depression Screen           Orders:         Depression screen negative  (In-House)          08969  [facility] PHYSICAL: CMP, CBC, TSH, LIPID: [id number], [id number], 64471, 30610  (Send-Out)             Patient Education Handouts:       Physical Exam 50-59 year, Male           Vaccination against other viral diseases, Influenza           Orders:       24407  Immunization administration; one vaccine  (In-House)         81097  Influenza virus vaccine, quadrivalent, split virus, preservative free 3 years of age & older  (In-House)            Vaccination against hepatitis A           Orders:       47471  Immunization administration; each additional vaccine/toxoid  (In-House)         24244  HepA vaccine adult dose for intramuscular use  (In-House)               CHARGE CAPTURE           **Please note: ICD descriptions below are intended for billing purposes only and may not represent clinical diagnoses**        Primary Diagnosis:         V70.0 Yearly physical exam            Z00.00    Encounter for general adult medical examination without abnormal findings              Orders:          75358   Preventive medicine, new patient, age 40-64 years  (In-House)                Depression screen negative  (In-House)           V04.81 Vaccination against other viral diseases, Influenza            Z23    Encounter for immunization              Orders:          00185   Immunization administration; one vaccine  (In-House)             74605   Influenza virus vaccine, quadrivalent, split virus, preservative free 3 years of age & older  (In-House)           V05.3 Vaccination against hepatitis A            Z23    Encounter for immunization              Orders:          89524   Immunization administration; each additional vaccine/toxoid  (In-House)             99031   HepA vaccine adult dose for intramuscular use  (In-House)

## 2021-05-18 NOTE — PROGRESS NOTES
John Shields  1965     Office/Outpatient Visit    Visit Date: Mon, Apr 12, 2021 04:26 pm    Provider: Jose R Mayen MD (Assistant: Giovanna Sun MA)    Location: [facility]        Electronically signed by Jose R Mayen MD on  04/14/2021 09:38:27 AM                             Subjective:        CC: physical exam    HPI:       John is in today for wellness exam.  He is 56 years old and is retired from [employer].  He does not smoke.  He uses some alcohol - beer.  He has had colonoscopy at age 48 which was apparently normal.  We do not have a copy of this report.  He is up to date on prostate check and sees urology regularly.          With regard to the essential (primary) hypertension, his current cardiac medication regimen includes an ACE inhibitor ( Zestril ).  He is tolerating the medication well without side effects.  Compliance with treatment has been good; he takes his medication as directed and follows up as directed.            John has noted a mass on the upper neck just behind the ear.  He noted this a few months ago.  He says that it is not painful.  He has not appreciate change in size.  He denies fever or chills or lymphadenopathy.    ROS:     CONSTITUTIONAL:  Negative for chills and fever.      CARDIOVASCULAR:  Negative for chest pain and palpitations.      RESPIRATORY:  Negative for recent cough and dyspnea.      GASTROINTESTINAL:  Negative for abdominal pain, nausea and vomiting.      INTEGUMENTARY:  Negative for atypical mole(s) and rash.          Past Medical History / Family History / Social History:         Last Reviewed on 4/12/2021 04:43 PM by Jose R Mayen    Past Medical History:             PAST MEDICAL HISTORY         Hypertension     Benign Prostatic Hypertrophy         Surgical History:     NONE         Family History:     Father: Rare Spinal Tumor     Mother: Hypertension         Social History:     Occupation: Retired (Prior  occupation: "[employer]     Marital Status:      Children: 3 children         Tobacco/Alcohol/Supplements:     Last Reviewed on 4/12/2021 04:43 PM by oJse R Mayen    Tobacco:  Nonsmoker (never smoked);         Alcohol:    Drinks alcohol on a regular basis.      Caffeine:  He admits to consuming caffeine via coffee ( 2 servings per day ).          Substance Abuse History:     Last Reviewed on 4/12/2021 04:43 PM by Jose R Mayen        Mental Health History:     Last Reviewed on 4/12/2021 04:43 PM by Jose R Mayen        Communicable Diseases (eg STDs):     Last Reviewed on 4/12/2021 04:43 PM by Jose R Mayen        Current Problems:     Last Reviewed on 4/12/2021 04:43 PM by Jose R Mayen    Thrombocytopenia, unspecified    Tachycardia, unspecified    Encounter for immunization    Pain in left hip        Immunizations:     COVID-19, mRNA, LNP-S, PF, 100 mcg/0.5 mL dose 3/29/2021    Hep A, adult (VAQTA) 4/23/2020    influenza, injectable, quadrivalent, preservative free (FLUZONE QUAD 0059-1631) 9/30/2020    Hep A, adult dose 10/17/2019    Fluzone pf (3+ years dose) 10/17/2019        Allergies:     Last Reviewed on 4/12/2021 04:43 PM by Jose R Mayen    No Known Allergies.        Current Medications:     Last Reviewed on 4/12/2021 04:43 PM by Jose R Mayen    tamsulosin 0.4 mg oral capsule [1 capsule bid ]    indomethacin 50 mg oral capsule [take 1 capsule tid]        Objective:        Vitals:         Current: 4/12/2021 4:29:25 PM    Ht:  6 ft, 0 in;  Wt: 191.8 lbs;  BMI: 26.0T: 98.4 F (temporal);  BP: 136/74 mm Hg (right arm, sitting);  P: 53 bpm (right arm (BP Cuff), sitting);  sCr: 1.14 mg/dL;  GFR: 71.73        Exams:     PHYSICAL EXAM:     GENERAL: Vitals recorded well developed, well nourished;     EYES: extraocular movements intact; conjunctiva and cornea are normal; PERRL;     E/N/T: EARS:  normal external auditory canals and tympanic  membranes;  grossly normal hearing; OROPHARYNX:  normal mucosa, dentition, gingiva, and posterior pharynx;     NECK: range of motion is normal; thyroid is non-palpable; there is a mass in the posterior and lateral neck - behind the ear - it is cyst like in is consistency - no firm mass is appreciated - no obvious lymphadenopathy;     RESPIRATORY: normal respiratory rate and pattern with no distress; normal breath sounds with no rales, rhonchi, wheezes or rubs;     CARDIOVASCULAR: normal rate; rhythm is regular;  no systolic murmur;     GASTROINTESTINAL: nontender; normal bowel sounds; no masses;     LYMPHATIC: no enlargement of cervical or facial nodes; no supraclavicular nodes; no axillary adenopathy;     SKIN:  no significant rashes or lesions; no suspicious moles;     NEUROLOGIC: mental status: alert and oriented x 3; cranial nerves II-XII grossly intact;     PSYCHIATRIC: appropriate affect and demeanor; normal psychomotor function;         Assessment:         Z00.01   Encounter for general adult medical examination with abnormal findings       I10   Essential (primary) hypertension       R22.1   Localized swelling, mass and lump, neck           ORDERS:         Meds Prescribed:       [Refilled] lisinopriL 20 mg oral tablet [take 1 tablet (20 mg) by oral route once daily], #90 (ninety) tablets, Refills: 3 (three)         Radiology/Test Orders:       08932  Computed tomography, soft tissue neck; with contrast material(s)  (Send-Out)              Lab Orders:       [id number]  [facility] PHYSICAL: CMP, CBC, TSH, LIPID: [id number], [id number], [id number], 06385  (Send-Out)                      Plan:         Encounter for general adult medical examination with abnormal findings    LABORATORY:  Labs ordered to be performed today include PHYSICAL PANEL; CMP, CBC, TSH, LIPID.      RECOMMENDATIONS given include: Today we have reviewed John's care.  I'm going to recommend no specific change in medication.  Lisinopril to be filled.  We will  get labs as noted and move forward with CT soft tissues neck.  Unless this is obviously a cyst that does not look worrisome, we will likely refer to ENT for their opinion..            Prescriptions:       [Refilled] lisinopriL 20 mg oral tablet [take 1 tablet (20 mg) by oral route once daily], #90 (ninety) tablets, Refills: 3 (three)           Orders:       [id number]  [facility] PHYSICAL: CMP, CBC, TSH, LIPID: [id number], [id number], 73195, 35428  (Send-Out)              Essential (primary) hypertensionAs above.        Localized swelling, mass and lump, neck          Orders:       46294  Computed tomography, soft tissue neck; with contrast material(s)  (Send-Out)                  Charge Capture:         Primary Diagnosis:     Z00.01  Encounter for general adult medical examination with abnormal findings           Orders:      48073  Preventive medicine, established patient, age 40-64 years  (In-House)              I10  Essential (primary) hypertension     R22.1  Localized swelling, mass and lump, neck

## 2021-07-01 VITALS
HEIGHT: 72 IN | BODY MASS INDEX: 24.87 KG/M2 | WEIGHT: 183.6 LBS | DIASTOLIC BLOOD PRESSURE: 84 MMHG | TEMPERATURE: 97.6 F | SYSTOLIC BLOOD PRESSURE: 132 MMHG | HEART RATE: 56 BPM

## 2021-07-02 VITALS
WEIGHT: 191.8 LBS | TEMPERATURE: 98.4 F | SYSTOLIC BLOOD PRESSURE: 136 MMHG | HEART RATE: 53 BPM | DIASTOLIC BLOOD PRESSURE: 74 MMHG | BODY MASS INDEX: 25.98 KG/M2 | HEIGHT: 72 IN

## 2021-07-02 VITALS
BODY MASS INDEX: 25.25 KG/M2 | WEIGHT: 186.4 LBS | HEART RATE: 66 BPM | HEIGHT: 72 IN | DIASTOLIC BLOOD PRESSURE: 76 MMHG | TEMPERATURE: 97.4 F | SYSTOLIC BLOOD PRESSURE: 117 MMHG

## 2021-07-02 VITALS
BODY MASS INDEX: 24.9 KG/M2 | DIASTOLIC BLOOD PRESSURE: 85 MMHG | HEART RATE: 72 BPM | HEIGHT: 72 IN | SYSTOLIC BLOOD PRESSURE: 127 MMHG

## 2021-10-02 ENCOUNTER — FLU SHOT (OUTPATIENT)
Dept: INTERNAL MEDICINE | Facility: CLINIC | Age: 56
End: 2021-10-02

## 2021-10-02 DIAGNOSIS — Z23 NEED FOR INFLUENZA VACCINATION: Primary | ICD-10-CM

## 2021-10-02 PROCEDURE — 90686 IIV4 VACC NO PRSV 0.5 ML IM: CPT | Performed by: INTERNAL MEDICINE

## 2021-10-02 PROCEDURE — 90471 IMMUNIZATION ADMIN: CPT | Performed by: INTERNAL MEDICINE

## 2021-10-15 ENCOUNTER — TELEPHONE (OUTPATIENT)
Dept: FAMILY MEDICINE CLINIC | Age: 56
End: 2021-10-15

## 2021-10-15 DIAGNOSIS — M79.671 RIGHT FOOT PAIN: Primary | ICD-10-CM

## 2021-10-15 NOTE — TELEPHONE ENCOUNTER
Caller: John Shields    Relationship: Self    Best call back number: [phone]    What is the medical concern/diagnosis: RIGHT FOOT PAIN    What specialty or service is being requested: PODIATRY    What is the provider, practice or medical service name: ANY IN [facility] OR [facility] AVAILABLE AS PER 'S PREFERENCE     Any additional details: PATIENT HAS A HISTORY OF GOUT BUT BELIEVES IT IS NOT A GOUT FLARE UP. HE HAS HAD PAIN FOR 4 WEEKS IN HIS RIGHT FOOT AROUND HIS MIDDLE TOE AND IT HAS IMPAIRED HIS WALKING.

## 2021-10-15 NOTE — TELEPHONE ENCOUNTER
Okay with me to arrange referral to podiatry.  Dr. Quispe would be fine.  If it will be quicker to [place] [facility] [place] okay to refer there.  Thanks.

## 2021-10-19 ENCOUNTER — OFFICE VISIT (OUTPATIENT)
Dept: PODIATRY | Facility: CLINIC | Age: 56
End: 2021-10-19

## 2021-10-19 VITALS
HEART RATE: 63 BPM | OXYGEN SATURATION: 100 % | HEIGHT: 72 IN | TEMPERATURE: 97.3 F | WEIGHT: 190 LBS | BODY MASS INDEX: 25.73 KG/M2 | DIASTOLIC BLOOD PRESSURE: 79 MMHG | SYSTOLIC BLOOD PRESSURE: 130 MMHG

## 2021-10-19 DIAGNOSIS — M79.671 FOOT PAIN, RIGHT: Primary | ICD-10-CM

## 2021-10-19 DIAGNOSIS — G57.61 MORTON'S NEUROMA OF RIGHT FOOT: ICD-10-CM

## 2021-10-19 PROCEDURE — 99203 OFFICE O/P NEW LOW 30 MIN: CPT | Performed by: PODIATRIST

## 2021-10-19 RX ORDER — DICLOFENAC SODIUM 75 MG/1
75 TABLET, DELAYED RELEASE ORAL 2 TIMES DAILY
Qty: 60 TABLET | Refills: 1 | Status: SHIPPED | OUTPATIENT
Start: 2021-10-19 | End: 2021-11-18

## 2021-10-19 RX ORDER — METHYLPREDNISOLONE 4 MG/1
TABLET ORAL
Qty: 21 TABLET | Refills: 0 | Status: SHIPPED | OUTPATIENT
Start: 2021-10-19 | End: 2022-05-10

## 2021-10-19 RX ORDER — LISINOPRIL 20 MG/1
20 TABLET ORAL DAILY
COMMUNITY
Start: 2021-10-17 | End: 2022-04-18 | Stop reason: SDUPTHER

## 2021-10-19 RX ORDER — TAMSULOSIN HYDROCHLORIDE 0.4 MG/1
1 CAPSULE ORAL 2 TIMES DAILY
COMMUNITY
Start: 2021-09-20

## 2021-10-19 NOTE — PROGRESS NOTES
[facility]    Today's Date: 10/19/21    Patient Name: John Shields  MRN: [id number]  CSN: [id number]  PCP: Jose R Mayen MD  Referring Provider: Jose R Mayen,*    SUBJECTIVE     Chief Complaint   Patient presents with   • Right Foot - Pain     HPI: John Shields, a 56 y.o.male, comes to clinic.    New, Established, New Problem: New    Location: Right third intermetatarsal space    Duration: Late summer 2021    Onset: Acute, after hitting his foot on his granddaughters crocs shoe    Nature:  achy, sharp, shooting    Stable, worsening, improving: Slowly improving    Aggravating factors:      Patient describes right pain as stabbing, burning, or aching. This pain is in the third intermetatarsal space.  This is aggravated with shoe gear and ambulation.  on their feet, and again the next morning.      Previous Treatment: OTC ibuprofen    Patient denies any fevers, chills, nausea, vomiting, shortness of breath, nor any other constitutional signs nor symptoms.    No other pedal complaints at this time.    Past Medical History:   Diagnosis Date   • Enlarged prostate    • Foot pain, right    • Hypertension      History reviewed. No pertinent surgical history.  Family History   Problem Relation Age of Onset   • Cancer Father         spine   • Diabetes Maternal Grandmother    • Heart attack Maternal Grandmother    • Heart disease Maternal Grandmother    • Stroke Neg Hx      Social History     Socioeconomic History   • Marital status:    Tobacco Use   • Smoking status: Never Smoker   • Smokeless tobacco: Never Used   Vaping Use   • Vaping Use: Never used   Substance and Sexual Activity   • Alcohol use: Yes   • Drug use: Never   • Sexual activity: Defer     No Known Allergies  Current Outpatient Medications   Medication Sig Dispense Refill   • lisinopril (PRINIVIL,ZESTRIL) 20 MG tablet Take 20 mg by mouth Daily.     • tamsulosin (FLOMAX) 0.4 MG capsule 24 hr capsule Take 1 capsule  by mouth 2 (Two) Times a Day.     • diclofenac (VOLTAREN) 75 MG EC tablet Take 1 tablet by mouth 2 (Two) Times a Day for 30 days. 60 tablet 1   • methylPREDNISolone (MEDROL) 4 MG dose pack Take as directed 21 tablet 0     No current facility-administered medications for this visit.     Review of Systems   Musculoskeletal:        Pain in right third intermetatarsal space.   All other systems reviewed and are negative.      OBJECTIVE     Vitals:    10/19/21 1053   BP: 130/79   Pulse: 63   Temp: 97.3 °F (36.3 °C)   SpO2: 100%       PHYSICAL EXAM     Foot/Ankle Exam:       General:   Appearance: appears stated age and healthy    Orientation: AAOx3    Affect: appropriate    Gait: unimpaired    Shoe Gear:  Casual shoes    VASCULAR      Right Foot Vascularity   Normal vascular exam    Dorsalis pedis:  2+  Posterior tibial:  2+  Skin Temperature: warm    Edema Grading:  None  CFT:  < 3 seconds  Pedal Hair Growth:  Present  Varicosities: none       Left Foot Vascularity   Normal vascular exam    Dorsalis pedis:  2+  Posterior tibial:  2+  Skin Temperature: warm    Edema Grading:  None  CFT:  < 3 seconds  Pedal Hair Growth:  Present  Varicosities: none        NEUROLOGIC     Right Foot Neurologic   Normal sensation    Light touch sensation:  Normal  Vibratory sensation:  Normal  Hot/Cold sensation: normal    Protective Sensation using Avalon-Yaquelin Monofilament:  10     Left Foot Neurologic   Normal sensation    Light touch sensation:  Normal  Vibratory sensation:  Normal  Hot/cold sensation: normal    Protective Sensation using Avalon-Yaquelin Monofilament:  10     MUSCULOSKELETAL      Right Foot Musculoskeletal   Ecchymosis:  None  Tenderness: neuroma and toe 3       MUSCLE STRENGTH     Right Foot Muscle Strength   Normal strength    Foot dorsiflexion:  5  Foot plantar flexion:  5  Foot inversion:  5  Foot eversion:  5     Left Foot Muscle Strength   Foot dorsiflexion:  5  Foot plantar flexion:  5  Foot inversion:   5  Foot eversion:  5     RANGE OF MOTION      Right Foot Range of Motion   Foot and ankle ROM within normal limits       Left Foot Range of Motion   Foot and ankle ROM within normal limits       DERMATOLOGIC     Right Foot Dermatologic   Skin: skin intact    Nails: normal       Left Foot Dermatologic   Skin: skin intact    Nails: normal        RADIOLOGY:    XR Foot 3+ View Right    Result Date: 10/19/2021  Narrative: IN-OFFICE IMAGING:  3 view, AP, MO, Lateral, right foot Indication: Pain Findings: Medial deviation of the first metatarsal with associated lateral deviation of the hallux at the metatarsal phalangeal joint.  Degenerative changes seen at first metatarsal phalangeal joint.  Diffuse degenerative changes seen in midfoot.  Posterior and inferior calcaneal enthesopathies. Comparison: Comparison views available.        ASSESSMENT/PLAN     Diagnoses and all orders for this visit:    1. Foot pain, right (Primary)    2. Spears's neuroma of right foot    Other orders  -     methylPREDNISolone (MEDROL) 4 MG dose pack; Take as directed  Dispense: 21 tablet; Refill: 0  -     diclofenac (VOLTAREN) 75 MG EC tablet; Take 1 tablet by mouth 2 (Two) Times a Day for 30 days.  Dispense: 60 tablet; Refill: 1      Comprehensive lower extremity examination and evaluation was performed.    Discussed findings and treatment plan including risks, benefits, and treatment options with patient in detail. Patient agreed with treatment plan.    Discussed proper shoegear for the patient's feet and medical condition.  Patient states understanding and agreement with this plan.    An After Visit Summary was printed and given to the patient at discharge, including (if requested) any available informative/educational handouts regarding diagnosis, treatment, or medications. All questions were answered to patient/family satisfaction. Should symptoms fail to improve or worsen they agree to call or return to clinic or to go to the Emergency  Department. Discussed the importance of following up with any needed screening tests/labs/specialist appointments and any requested follow-up recommended by me today. Importance of maintaining follow-up discussed and patient accepts that missed appointments can delay diagnosis and potentially lead to worsening of conditions.    Return in about 3 weeks (around 11/9/2021) for Prescription follow-up., or sooner if acute issues arise.    This document has been electronically signed by Bryce Quispe DPM on October 19, 2021 11:30 EDT

## 2022-04-18 RX ORDER — LISINOPRIL 20 MG/1
20 TABLET ORAL DAILY
Qty: 90 TABLET | Refills: 0 | Status: SHIPPED | OUTPATIENT
Start: 2022-04-18 | End: 2022-05-10 | Stop reason: SDUPTHER

## 2022-04-18 NOTE — TELEPHONE ENCOUNTER
Caller: John Shields    Relationship: Self    Best call back number: [phone]    Requested Prescriptions:   Requested Prescriptions     Pending Prescriptions Disp Refills   • lisinopril (PRINIVIL,ZESTRIL) 20 MG tablet       Sig: Take 1 tablet by mouth Daily.        Pharmacy where request should be sent: [facility] [address] [place] [address] ARAMIS LOOMIS [facility] [phone]  - [phone] FX     Does the patient have less than a 3 day supply:  [x] Yes  [] No    [place] SALDAÑA Rep   04/18/22 12:36 EDT

## 2022-05-10 ENCOUNTER — OFFICE VISIT (OUTPATIENT)
Dept: FAMILY MEDICINE CLINIC | Age: 57
End: 2022-05-10

## 2022-05-10 ENCOUNTER — LAB (OUTPATIENT)
Dept: LAB | Facility: HOSPITAL | Age: 57
End: 2022-05-10

## 2022-05-10 VITALS
HEART RATE: 79 BPM | BODY MASS INDEX: 25.9 KG/M2 | HEIGHT: 72 IN | DIASTOLIC BLOOD PRESSURE: 81 MMHG | TEMPERATURE: 98.6 F | WEIGHT: 191.2 LBS | SYSTOLIC BLOOD PRESSURE: 132 MMHG

## 2022-05-10 DIAGNOSIS — Z00.00 PHYSICAL EXAM: Primary | ICD-10-CM

## 2022-05-10 DIAGNOSIS — Z00.00 PHYSICAL EXAM: ICD-10-CM

## 2022-05-10 DIAGNOSIS — R73.9 HYPERGLYCEMIA: ICD-10-CM

## 2022-05-10 DIAGNOSIS — Z11.59 NEED FOR HEPATITIS C SCREENING TEST: ICD-10-CM

## 2022-05-10 DIAGNOSIS — I10 ESSENTIAL HYPERTENSION: ICD-10-CM

## 2022-05-10 LAB
ALBUMIN SERPL-MCNC: 4.7 G/DL (ref 3.5–5.2)
ALBUMIN/GLOB SERPL: 2.5 G/DL
ALP SERPL-CCNC: 52 U/L (ref 39–117)
ALT SERPL W P-5'-P-CCNC: 16 U/L (ref 1–41)
ANION GAP SERPL CALCULATED.3IONS-SCNC: 10.7 MMOL/L (ref 5–15)
AST SERPL-CCNC: 20 U/L (ref 1–40)
BILIRUB SERPL-MCNC: 0.4 MG/DL (ref 0–1.2)
BUN SERPL-MCNC: 10 MG/DL (ref 6–20)
BUN/CREAT SERPL: 10.2 (ref 7–25)
CALCIUM SPEC-SCNC: 9.8 MG/DL (ref 8.6–10.5)
CHLORIDE SERPL-SCNC: 102 MMOL/L (ref 98–107)
CHOLEST SERPL-MCNC: 192 MG/DL (ref 0–200)
CO2 SERPL-SCNC: 26.3 MMOL/L (ref 22–29)
CREAT SERPL-MCNC: 0.98 MG/DL (ref 0.76–1.27)
DEPRECATED RDW RBC AUTO: 40 FL (ref 37–54)
EGFRCR SERPLBLD CKD-EPI 2021: 88.8 ML/MIN/1.73
ERYTHROCYTE [DISTWIDTH] IN BLOOD BY AUTOMATED COUNT: 12.2 % (ref 12.3–15.4)
GLOBULIN UR ELPH-MCNC: 1.9 GM/DL
GLUCOSE SERPL-MCNC: 102 MG/DL (ref 65–99)
HBA1C MFR BLD: 5.4 % (ref 4.8–5.6)
HCT VFR BLD AUTO: 46.7 % (ref 37.5–51)
HCV AB SER DONR QL: NORMAL
HDLC SERPL-MCNC: 66 MG/DL (ref 40–60)
HGB BLD-MCNC: 15.5 G/DL (ref 13–17.7)
LDLC SERPL CALC-MCNC: 117 MG/DL (ref 0–100)
LDLC/HDLC SERPL: 1.77 {RATIO}
MCH RBC QN AUTO: 29.4 PG (ref 26.6–33)
MCHC RBC AUTO-ENTMCNC: 33.2 G/DL (ref 31.5–35.7)
MCV RBC AUTO: 88.6 FL (ref 79–97)
PLATELET # BLD AUTO: 143 10*3/MM3 (ref 140–450)
PMV BLD AUTO: 11.1 FL (ref 6–12)
POTASSIUM SERPL-SCNC: 4.8 MMOL/L (ref 3.5–5.2)
PROT SERPL-MCNC: 6.6 G/DL (ref 6–8.5)
RBC # BLD AUTO: 5.27 10*6/MM3 (ref 4.14–5.8)
SODIUM SERPL-SCNC: 139 MMOL/L (ref 136–145)
TRIGL SERPL-MCNC: 45 MG/DL (ref 0–150)
TSH SERPL DL<=0.05 MIU/L-ACNC: 4.24 UIU/ML (ref 0.27–4.2)
VLDLC SERPL-MCNC: 9 MG/DL (ref 5–40)
WBC NRBC COR # BLD: 4.24 10*3/MM3 (ref 3.4–10.8)

## 2022-05-10 PROCEDURE — 80061 LIPID PANEL: CPT

## 2022-05-10 PROCEDURE — 99396 PREV VISIT EST AGE 40-64: CPT | Performed by: FAMILY MEDICINE

## 2022-05-10 PROCEDURE — 36415 COLL VENOUS BLD VENIPUNCTURE: CPT

## 2022-05-10 PROCEDURE — 80050 GENERAL HEALTH PANEL: CPT

## 2022-05-10 PROCEDURE — 83036 HEMOGLOBIN GLYCOSYLATED A1C: CPT

## 2022-05-10 PROCEDURE — 86803 HEPATITIS C AB TEST: CPT

## 2022-05-10 RX ORDER — LISINOPRIL 20 MG/1
20 TABLET ORAL DAILY
Qty: 90 TABLET | Refills: 3 | Status: SHIPPED | OUTPATIENT
Start: 2022-05-10

## 2022-05-10 NOTE — PROGRESS NOTES
John Shields presents to [facility].    Chief Complaint:  Physical exam, blood pressure    Subjective   {Problem List  Visit Diagnosis   Encounters  Notes  Medications  Labs  Result Review Imaging  Media :23}     History of Present Illness:  John is in today for wellness exam.  He is 57 years old and is retired from [employer].  He does not smoke.  He uses some alcohol - beer.  He has had colonoscopy at age 48 which was apparently normal.  We have not been able to track down a copy of this report.  He is up to date on prostate check and sees urology regularly.    In addition, John has hypertension and remains on treatment for this.  He does not check his blood pressure routinely at home.  He is currently taking lisinopril and tolerates that fairly well.  He does have some dizziness that seems to bother him.  This is more of an issue when he lays down in bed and when he gets up in the mornings.  It tends to be brief in nature.      Review of Systems:  Review of Systems   Constitutional: Negative for chills and fever.   Respiratory: Negative for cough and shortness of breath.    Cardiovascular: Negative for chest pain and palpitations.   Gastrointestinal: Negative for abdominal pain, nausea and vomiting.   Neurological: Positive for dizziness.        Objective   Medical History:  Past Medical History:   • Enlarged prostate   • Foot pain, right   • Hypertension     No past surgical history on file.   Family History   Problem Relation Age of Onset   • Cancer Father         spine   • Diabetes Maternal Grandmother    • Heart attack Maternal Grandmother    • Heart disease Maternal Grandmother    • Stroke Neg Hx      Social History     Tobacco Use   • Smoking status: Never Smoker   • Smokeless tobacco: Never Used   Substance Use Topics   • Alcohol use: Yes       Health Maintenance Due   Topic Date Due   • TDAP/TD VACCINES (1 - Tdap) Never done   • ZOSTER VACCINE (1 of 2) Never  "done   • HEPATITIS C SCREENING  Never done        Immunization History   Administered Date(s) Administered   • COVID-19 (MODERNA) 1st, 2nd, 3rd Dose Only 03/29/2021, 04/26/2021, 11/11/2021   • Flu Vaccine Quad PF >36MO 09/28/2018   • FluLaval/Fluarix/Fluzone >6 10/02/2021       No Known Allergies     Medications:  Current Outpatient Medications on File Prior to Visit   Medication Sig   • tamsulosin (FLOMAX) 0.4 MG capsule 24 hr capsule Take 1 capsule by mouth 2 (Two) Times a Day.   • [DISCONTINUED] lisinopril (PRINIVIL,ZESTRIL) 20 MG tablet Take 1 tablet by mouth Daily.   • [DISCONTINUED] methylPREDNISolone (MEDROL) 4 MG dose pack Take as directed     No current facility-administered medications on file prior to visit.       Vital Signs:   /81 (BP Location: Right arm, Patient Position: Sitting)   Pulse 79   Temp 98.6 °F (37 °C) (Oral)   Ht 182.9 cm (72.01\")   Wt 86.7 kg (191 lb 3.2 oz)   BMI 25.93 kg/m²       Physical Exam:  Physical Exam  Vitals and nursing note reviewed.   Constitutional:       General: He is not in acute distress.     Appearance: He is not ill-appearing.   HENT:      Right Ear: Tympanic membrane and ear canal normal.      Left Ear: Tympanic membrane and ear canal normal.      Mouth/Throat:      Mouth: Mucous membranes are moist.      Comments: Pharynx appears normal  Eyes:      Extraocular Movements: Extraocular movements intact.      Pupils: Pupils are equal, round, and reactive to light.   Neck:      Thyroid: No thyromegaly.   Cardiovascular:      Rate and Rhythm: Normal rate and regular rhythm.      Heart sounds: No murmur heard.  Pulmonary:      Effort: Pulmonary effort is normal.      Breath sounds: Normal breath sounds.   Abdominal:      General: There is no distension.      Palpations: Abdomen is soft. There is no mass.      Tenderness: There is no abdominal tenderness.   Musculoskeletal:      Cervical back: Normal range of motion.   Skin:     Findings: No lesion or rash. "   Neurological:      General: No focal deficit present.      Mental Status: He is oriented to person, place, and time.      Cranial Nerves: No cranial nerve deficit.   Psychiatric:         Mood and Affect: Mood normal.         Result Review      The following data was reviewed by Jose R Mayen MD on 05/10/2022.  Lab Results   Component Value Date    WBC 5.04 04/14/2021    HGB 15.30 04/14/2021    HCT 45.0 04/14/2021    MCV 88.6 04/14/2021    .00 04/14/2021     Lab Results   Component Value Date    GLUCOSE 107 (H) 04/14/2021    BUN 17 04/14/2021    CREATININE 0.99 04/14/2021     04/14/2021    K 4.4 04/14/2021     04/14/2021    CO2 25 04/14/2021    CALCIUM 9.4 04/14/2021    PROTEINTOT 7.0 04/14/2021    ALBUMIN 4.4 04/14/2021    ALT 22 04/14/2021    AST 22 04/14/2021    ALKPHOS 53 (L) 04/14/2021    BILITOT 0.77 04/14/2021    GLOB 2.6 04/14/2021    BCR 17 04/14/2021    ANIONGAP 17 04/14/2021      Lab Results   Component Value Date    CHLPL 206 (H) 04/14/2021    TRIG 52 04/14/2021    HDL 73 (H) 04/14/2021     (H) 04/14/2021     Lab Results   Component Value Date    TSH 4.140 04/14/2021     Lab Results   Component Value Date    HGBA1C 5.4 04/14/2021            Assessment and Plan:   Today, we have reviewed John's care.  He seems well.  We will refill his medication for a year as noted below and schedule a follow-up in about a year.  Labs to be updated as noted.  I did recommend a COVID-19 booster, and he will check with one of the pharmacies regarding this.  No other short-term changes anticipated.  He again seems to be in good overall health.  We reviewed the colonoscopy and will plan to repeat that next year sometime.       Diagnoses and all orders for this visit:    1. Physical exam (Primary)  -     CBC (No Diff); Future  -     Comprehensive Metabolic Panel; Future  -     Lipid Panel; Future  -     TSH; Future  -     Hepatitis C Antibody; Future  -     Hemoglobin A1c; Future    2.  Essential hypertension  -     lisinopril (PRINIVIL,ZESTRIL) 20 MG tablet; Take 1 tablet by mouth Daily.  Dispense: 90 tablet; Refill: 3    3. Need for hepatitis C screening test  -     Hepatitis C Antibody; Future    4. Hyperglycemia  -     Hemoglobin A1c; Future          Follow Up   Return in about 1 year (around 5/11/2023) for Recheck.  Patient was given instructions and counseling regarding his condition or for health maintenance advice. Please see specific information pulled into the AVS if appropriate.

## 2022-08-11 ENCOUNTER — TELEPHONE (OUTPATIENT)
Dept: FAMILY MEDICINE CLINIC | Age: 57
End: 2022-08-11

## 2022-08-11 DIAGNOSIS — R94.6 ABNORMAL THYROID FUNCTION TEST: ICD-10-CM

## 2022-08-11 DIAGNOSIS — E78.2 MIXED HYPERLIPIDEMIA: Primary | ICD-10-CM

## 2022-08-11 NOTE — TELEPHONE ENCOUNTER
----- Message from Christine Ramirez LPN sent at 5/11/2022  9:29 AM EDT -----  TICKLE repeat TSH, lipid panel, ALT in 90 days.

## 2022-08-23 ENCOUNTER — LAB (OUTPATIENT)
Dept: LAB | Facility: HOSPITAL | Age: 57
End: 2022-08-23

## 2022-08-23 DIAGNOSIS — E78.2 MIXED HYPERLIPIDEMIA: ICD-10-CM

## 2022-08-23 DIAGNOSIS — R94.6 ABNORMAL THYROID FUNCTION TEST: ICD-10-CM

## 2022-08-23 LAB
ALT SERPL W P-5'-P-CCNC: 20 U/L (ref 1–41)
CHOLEST SERPL-MCNC: 207 MG/DL (ref 0–200)
HDLC SERPL-MCNC: 72 MG/DL (ref 40–60)
LDLC SERPL CALC-MCNC: 127 MG/DL (ref 0–100)
LDLC/HDLC SERPL: 1.75 {RATIO}
TRIGL SERPL-MCNC: 45 MG/DL (ref 0–150)
TSH SERPL DL<=0.05 MIU/L-ACNC: 4.87 UIU/ML (ref 0.27–4.2)
VLDLC SERPL-MCNC: 8 MG/DL (ref 5–40)

## 2022-08-23 PROCEDURE — 84460 ALANINE AMINO (ALT) (SGPT): CPT

## 2022-08-23 PROCEDURE — 84443 ASSAY THYROID STIM HORMONE: CPT

## 2022-08-23 PROCEDURE — 84439 ASSAY OF FREE THYROXINE: CPT | Performed by: FAMILY MEDICINE

## 2022-08-23 PROCEDURE — 80061 LIPID PANEL: CPT

## 2022-08-23 PROCEDURE — 36415 COLL VENOUS BLD VENIPUNCTURE: CPT

## 2022-08-23 PROCEDURE — 84481 FREE ASSAY (FT-3): CPT | Performed by: FAMILY MEDICINE

## 2022-08-24 DIAGNOSIS — R94.6 ABNORMAL THYROID FUNCTION TEST: Primary | ICD-10-CM

## 2022-08-24 LAB
T3FREE SERPL-MCNC: 3.12 PG/ML (ref 2–4.4)
T4 FREE SERPL-MCNC: 1.09 NG/DL (ref 0.93–1.7)

## 2023-06-19 ENCOUNTER — OFFICE VISIT (OUTPATIENT)
Dept: FAMILY MEDICINE CLINIC | Age: 58
End: 2023-06-19
Payer: COMMERCIAL

## 2023-06-19 VITALS
HEART RATE: 52 BPM | WEIGHT: 186 LBS | BODY MASS INDEX: 25.19 KG/M2 | DIASTOLIC BLOOD PRESSURE: 89 MMHG | SYSTOLIC BLOOD PRESSURE: 151 MMHG | HEIGHT: 72 IN | OXYGEN SATURATION: 96 %

## 2023-06-19 DIAGNOSIS — R73.9 HYPERGLYCEMIA: ICD-10-CM

## 2023-06-19 DIAGNOSIS — Z12.11 SCREEN FOR COLON CANCER: ICD-10-CM

## 2023-06-19 DIAGNOSIS — I10 ESSENTIAL HYPERTENSION: ICD-10-CM

## 2023-06-19 DIAGNOSIS — E78.2 MIXED HYPERLIPIDEMIA: ICD-10-CM

## 2023-06-19 DIAGNOSIS — Z00.00 PHYSICAL EXAM: Primary | ICD-10-CM

## 2023-06-19 PROCEDURE — 99396 PREV VISIT EST AGE 40-64: CPT | Performed by: FAMILY MEDICINE

## 2023-06-19 RX ORDER — LISINOPRIL 20 MG/1
20 TABLET ORAL DAILY
Qty: 90 TABLET | Refills: 3 | Status: SHIPPED | OUTPATIENT
Start: 2023-06-19

## 2023-06-19 NOTE — PROGRESS NOTES
John Shields presents to [facility].    Chief Complaint:  Annual physical    Subjective        History of Present Illness:  Annual physical.  He is 58 years old and has retired.  He does not smoke and never has.  He probably has a couple of beers a day.  He does not have concerns about how much he drinks.  He is due for some type of colon cancer screening at this point.  He sees urology due to enlarged prostate.    John has hypertension and remains on lisinopril on a daily basis.  He tolerates the medication well.  His blood pressure is mildly elevated here.  It runs in a similar range at home.    Review of Systems:  Review of Systems   Constitutional:  Negative for chills and fever.   Respiratory:  Negative for cough and shortness of breath.    Cardiovascular:  Negative for chest pain and palpitations.   Gastrointestinal:  Negative for abdominal pain, nausea and vomiting.      Objective   Medical History:  Past Medical History:    Enlarged prostate    Essential hypertension     Past Surgical History:    COLONOSCOPY    Normal per history      Family History   Problem Relation Age of Onset    Hypertension Mother     Cancer Father         Rare spinal tumor    Diabetes Maternal Grandmother     Heart attack Maternal Grandmother     Heart disease Maternal Grandmother     Stroke Neg Hx      Social History     Tobacco Use    Smoking status: Never    Smokeless tobacco: Never   Substance Use Topics    Alcohol use: Yes     Alcohol/week: 10.0 standard drinks     Types: 10 Cans of beer per week       Health Maintenance Due   Topic Date Due    COLORECTAL CANCER SCREENING  Never done    TDAP/TD VACCINES (1 - Tdap) Never done        Immunization History   Administered Date(s) Administered    COVID-19 (MODERNA) 1st,2nd,3rd Dose Monovalent 03/29/2021, 04/26/2021, 11/11/2021    COVID-19 (MODERNA) BIVALENT 12+YRS 10/13/2022    Flu Vaccine Quad PF >36MO 09/28/2018    FluLaval/Fluzone >6mos 10/02/2021     "Influenza Injectable Mdck Pf Quad 10/13/2022    Shingrix 07/15/2022, 09/19/2022       No Known Allergies     Medications:  Current Outpatient Medications on File Prior to Visit   Medication Sig    tamsulosin (FLOMAX) 0.4 MG capsule 24 hr capsule Take 1 capsule by mouth Daily.    [DISCONTINUED] lisinopril (PRINIVIL,ZESTRIL) 20 MG tablet Take 1 tablet by mouth Daily.     No current facility-administered medications on file prior to visit.       Vital Signs:   Vitals:    06/19/23 0800 06/19/23 0838   BP: 141/84 151/89   BP Location: Left arm Left arm   Patient Position: Sitting Sitting   Cuff Size: Adult    Pulse: 52 52   SpO2: 96%    Weight: 84.4 kg (186 lb)    Height: 182.9 cm (72.01\")    Body mass index is 25.22 kg/m².    Physical Exam:  Physical Exam  Vitals and nursing note reviewed.   Constitutional:       General: He is not in acute distress.     Appearance: He is not ill-appearing.   HENT:      Right Ear: Tympanic membrane and ear canal normal.      Left Ear: Tympanic membrane and ear canal normal.      Mouth/Throat:      Mouth: Mucous membranes are moist.      Comments: Pharynx appears normal  Eyes:      Extraocular Movements: Extraocular movements intact.      Pupils: Pupils are equal, round, and reactive to light.   Neck:      Thyroid: No thyromegaly.   Cardiovascular:      Rate and Rhythm: Normal rate and regular rhythm.      Heart sounds: No murmur heard.  Pulmonary:      Effort: Pulmonary effort is normal.      Breath sounds: Normal breath sounds.   Abdominal:      General: There is no distension.      Palpations: Abdomen is soft. There is no mass.      Tenderness: There is no abdominal tenderness.   Musculoskeletal:      Cervical back: Normal range of motion.   Skin:     Findings: No lesion or rash.   Neurological:      General: No focal deficit present.      Mental Status: He is oriented to person, place, and time.      Cranial Nerves: No cranial nerve deficit.   Psychiatric:         Mood and Affect: " Mood normal.       Result Review      The following data was reviewed by Jose R Mayen MD on 06/19/2023.  Lab Results   Component Value Date    WBC 4.24 05/10/2022    HGB 15.5 05/10/2022    HCT 46.7 05/10/2022    MCV 88.6 05/10/2022     05/10/2022     Lab Results   Component Value Date    GLUCOSE 102 (H) 05/10/2022    BUN 10 05/10/2022    CREATININE 0.98 05/10/2022     05/10/2022    K 4.8 05/10/2022     05/10/2022    CO2 26.3 05/10/2022    CALCIUM 9.8 05/10/2022    PROTEINTOT 6.6 05/10/2022    ALBUMIN 4.70 05/10/2022    ALT 20 08/23/2022    AST 20 05/10/2022    ALKPHOS 52 05/10/2022    BILITOT 0.4 05/10/2022    EGFR 88.8 05/10/2022    GLOB 1.9 05/10/2022    AGRATIO 2.5 05/10/2022    BCR 10.2 05/10/2022    ANIONGAP 10.7 05/10/2022      Lab Results   Component Value Date    CHOL 207 (H) 08/23/2022    CHLPL 206 (H) 04/14/2021    TRIG 45 08/23/2022    HDL 72 (H) 08/23/2022     (H) 08/23/2022     Lab Results   Component Value Date    TSH 4.870 (H) 08/23/2022     Lab Results   Component Value Date    HGBA1C 5.40 05/10/2022            Assessment and Plan:   Today, we have reviewed his care.  John continues to be in good health overall.  We will refill needed medications and update labs as noted below.  Cologuard will be ordered after discussing its limitations.  Tentatively, we will plan to see him back in about a year.  His blood pressure will be rechecked today.  We may want to have him stop in for blood pressure check and we will try to control his pressure.       Diagnoses and all orders for this visit:    1. Physical exam (Primary)  -     CBC (No Diff); Future  -     Comprehensive Metabolic Panel; Future  -     Lipid Panel; Future  -     TSH; Future  -     Hemoglobin A1c; Future  -     Testosterone; Future    2. Essential hypertension  -     Comprehensive Metabolic Panel; Future  -     lisinopril (PRINIVIL,ZESTRIL) 20 MG tablet; Take 1 tablet by mouth Daily.  Dispense: 90 tablet;  Refill: 3    3. Mixed hyperlipidemia  -     Comprehensive Metabolic Panel; Future  -     Lipid Panel; Future  -     TSH; Future    4. Hyperglycemia  -     Hemoglobin A1c; Future    5. Screen for colon cancer  -     Cologuard - Stool, Per Rectum; Future    Follow Up   Return in about 1 year (around 6/19/2024) for Recheck, Annual physical.  Patient was given instructions and counseling regarding his condition or for health maintenance advice. Please see specific information pulled into the AVS if appropriate.

## 2023-08-03 ENCOUNTER — CLINICAL SUPPORT (OUTPATIENT)
Dept: FAMILY MEDICINE CLINIC | Age: 58
End: 2023-08-03
Payer: COMMERCIAL

## 2023-08-03 VITALS — DIASTOLIC BLOOD PRESSURE: 83 MMHG | HEART RATE: 54 BPM | SYSTOLIC BLOOD PRESSURE: 133 MMHG

## 2023-08-14 ENCOUNTER — HOSPITAL ENCOUNTER (OUTPATIENT)
Dept: GENERAL RADIOLOGY | Facility: HOSPITAL | Age: 58
Discharge: HOME | End: 2023-08-14
Admitting: PHYSICIAN ASSISTANT
Payer: COMMERCIAL

## 2023-08-14 ENCOUNTER — OFFICE VISIT (OUTPATIENT)
Dept: FAMILY MEDICINE CLINIC | Age: 58
End: 2023-08-14
Payer: COMMERCIAL

## 2023-08-14 VITALS
TEMPERATURE: 98.5 F | OXYGEN SATURATION: 98 % | WEIGHT: 185 LBS | DIASTOLIC BLOOD PRESSURE: 84 MMHG | SYSTOLIC BLOOD PRESSURE: 136 MMHG | BODY MASS INDEX: 25.06 KG/M2 | HEART RATE: 59 BPM | HEIGHT: 72 IN

## 2023-08-14 DIAGNOSIS — R05.9 COUGH, UNSPECIFIED TYPE: ICD-10-CM

## 2023-08-14 DIAGNOSIS — J18.9 PNEUMONIA OF RIGHT MIDDLE LOBE DUE TO INFECTIOUS ORGANISM: Primary | ICD-10-CM

## 2023-08-14 DIAGNOSIS — R06.2 WHEEZING: ICD-10-CM

## 2023-08-14 LAB
EXPIRATION DATE: NORMAL
FLUAV AG UPPER RESP QL IA.RAPID: NOT DETECTED
FLUBV AG UPPER RESP QL IA.RAPID: NOT DETECTED
INTERNAL CONTROL: NORMAL
Lab: NORMAL
SARS-COV-2 AG UPPER RESP QL IA.RAPID: NOT DETECTED

## 2023-08-14 PROCEDURE — 99213 OFFICE O/P EST LOW 20 MIN: CPT | Performed by: PHYSICIAN ASSISTANT

## 2023-08-14 PROCEDURE — 71046 X-RAY EXAM CHEST 2 VIEWS: CPT

## 2023-08-14 PROCEDURE — 87428 SARSCOV & INF VIR A&B AG IA: CPT | Performed by: PHYSICIAN ASSISTANT

## 2023-08-14 RX ORDER — AZITHROMYCIN 250 MG/1
TABLET, FILM COATED ORAL
Qty: 6 TABLET | Refills: 0 | Status: SHIPPED | OUTPATIENT
Start: 2023-08-14

## 2023-08-14 RX ORDER — AMOXICILLIN AND CLAVULANATE POTASSIUM 875; 125 MG/1; MG/1
1 TABLET, FILM COATED ORAL 2 TIMES DAILY
Qty: 10 TABLET | Refills: 0 | Status: SHIPPED | OUTPATIENT
Start: 2023-08-14 | End: 2023-08-19

## 2023-08-14 RX ORDER — METHYLPREDNISOLONE 4 MG/1
TABLET ORAL
Qty: 21 EACH | Refills: 0 | Status: SHIPPED | OUTPATIENT
Start: 2023-08-14

## 2023-08-14 RX ORDER — ALBUTEROL SULFATE 90 UG/1
2 AEROSOL, METERED RESPIRATORY (INHALATION) EVERY 4 HOURS PRN
Qty: 18 G | Refills: 0 | Status: SHIPPED | OUTPATIENT
Start: 2023-08-14

## 2023-08-14 RX ORDER — BENZONATATE 200 MG/1
200 CAPSULE ORAL 3 TIMES DAILY PRN
Qty: 21 CAPSULE | Refills: 0 | Status: SHIPPED | OUTPATIENT
Start: 2023-08-14

## 2023-08-14 NOTE — PROGRESS NOTES
"  Diagnoses and all orders for this visit:    1. Pneumonia of right middle lobe due to infectious organism (Primary)  -     albuterol sulfate  (90 Base) MCG/ACT inhaler; Inhale 2 puffs Every 4 (Four) Hours As Needed for Wheezing or Shortness of Air.  Dispense: 18 g; Refill: 0  -     amoxicillin-clavulanate (AUGMENTIN) 875-125 MG per tablet; Take 1 tablet by mouth 2 (Two) Times a Day for 5 days.  Dispense: 10 tablet; Refill: 0  -     azithromycin (Zithromax Z-Cristian) 250 MG tablet; Take 2 tablets by mouth on day 1, then 1 tablet daily on days 2-5  Dispense: 6 tablet; Refill: 0    2. Cough, unspecified type  -     POCT SARS-CoV-2 Antigen JAVID + Flu  -     XR Chest PA & Lateral; Future  -     benzonatate (TESSALON) 200 MG capsule; Take 1 capsule by mouth 3 (Three) Times a Day As Needed for Cough.  Dispense: 21 capsule; Refill: 0    3. Wheezing  -     methylPREDNISolone (MEDROL) 4 MG dose pack; Take as directed on package instructions.  Dispense: 21 each; Refill: 0  -     albuterol sulfate  (90 Base) MCG/ACT inhaler; Inhale 2 puffs Every 4 (Four) Hours As Needed for Wheezing or Shortness of Air.  Dispense: 18 g; Refill: 0            Subjective     CHIEF COMPLAINT    Chief Complaint   Patient presents with    Cough     Headache, occasionally coughs up some chest congestion X 3-4 weeks             History of Present Illness  This is a 58-year-old male presenting the clinic with complaint of cough for the last 3 to 4 weeks.  He states initially he had 3 to 4 days of chest congestion, head congestion, and fatigue.  The remainder of his symptoms have resolved and he continues with this \"irritating cough.\"  He has noticed coughing worse with talking or taking a deep breath.  His cough is dry and associated with some chest irritation, rawness, or itching.  Occasionally his head hurts with a cough as well.  He does not feel significant shortness of breath and has not had any fevers, chills, or body aches.  He denies " any known sick contacts recently.          Review of Systems   Constitutional:  Positive for fatigue (resolved). Negative for chills and fever.   HENT:  Negative for rhinorrhea and sore throat.    Respiratory:  Positive for cough. Negative for shortness of breath and wheezing.    Cardiovascular:  Negative for chest pain.   Gastrointestinal:  Negative for abdominal pain, diarrhea, nausea and vomiting.   Musculoskeletal:  Negative for myalgias.   Skin:  Negative for rash.   Neurological:  Positive for headaches.          Past Medical History:   Diagnosis Date    Enlarged prostate     Essential hypertension             Past Surgical History:   Procedure Laterality Date    COLONOSCOPY  2013    Normal per history            Family History   Problem Relation Age of Onset    Hypertension Mother     Cancer Father         Rare spinal tumor    Diabetes Maternal Grandmother     Heart attack Maternal Grandmother     Heart disease Maternal Grandmother     Stroke Neg Hx             Social History     Socioeconomic History    Marital status:      Spouse name: Geraldine    Number of children: 3   Tobacco Use    Smoking status: Never    Smokeless tobacco: Never   Vaping Use    Vaping Use: Never used   Substance and Sexual Activity    Alcohol use: Yes     Alcohol/week: 10.0 standard drinks     Types: 10 Cans of beer per week    Drug use: Never    Sexual activity: Not Currently     Partners: Female     Birth control/protection: None            No Known Allergies         Current Outpatient Medications on File Prior to Visit   Medication Sig Dispense Refill    lisinopril (PRINIVIL,ZESTRIL) 20 MG tablet Take 1 tablet by mouth Daily. 90 tablet 3    rosuvastatin (Crestor) 10 MG tablet Take 1 tablet by mouth Daily. 90 tablet 3    tamsulosin (FLOMAX) 0.4 MG capsule 24 hr capsule Take 1 capsule by mouth Daily.       No current facility-administered medications on file prior to visit.            /84 (BP Location: Left arm, Patient  "Position: Sitting, Cuff Size: Adult)   Pulse 59   Temp 98.5 øF (36.9 øC) (Oral)   Ht 182.9 cm (72.01\")   Wt 83.9 kg (185 lb)   SpO2 98%   BMI 25.08 kg/mý          Objective     Physical Exam  Vitals and nursing note reviewed.   Constitutional:       General: He is not in acute distress.     Appearance: Normal appearance.   HENT:      Head: Normocephalic and atraumatic.      Right Ear: Tympanic membrane, ear canal and external ear normal.      Left Ear: Tympanic membrane, ear canal and external ear normal.      Nose: No congestion or rhinorrhea.      Mouth/Throat:      Mouth: Mucous membranes are moist.      Pharynx: Oropharynx is clear. No posterior oropharyngeal erythema.   Eyes:      Extraocular Movements: Extraocular movements intact.      Conjunctiva/sclera: Conjunctivae normal.      Pupils: Pupils are equal, round, and reactive to light.   Cardiovascular:      Rate and Rhythm: Normal rate and regular rhythm.      Heart sounds: Normal heart sounds.   Pulmonary:      Effort: Pulmonary effort is normal. No respiratory distress.      Breath sounds: Wheezing (Very faint wheeze right lung) present.   Musculoskeletal:      Cervical back: Normal range of motion. No rigidity.   Skin:     General: Skin is warm and dry.   Neurological:      Mental Status: He is alert and oriented to person, place, and time.   Psychiatric:         Mood and Affect: Mood normal.         Behavior: Behavior normal.            Procedures                    Lab Results (last 24 hours)       Procedure Component Value Units Date/Time    POCT SARS-CoV-2 Antigen JAVID + Flu [[id number]] Collected: 08/14/23 1122    Specimen: Swab Updated: 08/14/23 1123     SARS Antigen Not Detected     Influenza A Antigen JAVID Not Detected     Influenza B Antigen JAVID Not Detected     Internal Control Passed     Lot Number [id number]     Expiration Date 12/22/23                  XR Chest PA & Lateral    Result Date: 8/14/2023  PROCEDURE: XR CHEST PA AND LATERAL  " COMPARISON: None  INDICATIONS: COUGH, SHORTNESS OF BREATH X 3-4 WEEKS  FINDINGS: There is abnormal density in the right middle lobe indicating a developing infiltrate. No significant pleural effusions are seen. The cardiac silhouette is within normal limits for size. The mediastinal soft tissues are unremarkable. No acute osseous abnormalities are observed.       Evidence for developing right middle lobe pneumonia. Recommend correlation for signs or symptoms of acute infection and followup to resolution.       LUIS ALFREDO LOPEZ MD       Electronically Signed and Approved By: LUIS ALFREDO LOPEZ MD on 8/14/2023 at 12:13                              Diagnoses and all orders for this visit:    1. Pneumonia of right middle lobe due to infectious organism (Primary)  -     albuterol sulfate  (90 Base) MCG/ACT inhaler; Inhale 2 puffs Every 4 (Four) Hours As Needed for Wheezing or Shortness of Air.  Dispense: 18 g; Refill: 0  -     amoxicillin-clavulanate (AUGMENTIN) 875-125 MG per tablet; Take 1 tablet by mouth 2 (Two) Times a Day for 5 days.  Dispense: 10 tablet; Refill: 0  -     azithromycin (Zithromax Z-Cristian) 250 MG tablet; Take 2 tablets by mouth on day 1, then 1 tablet daily on days 2-5  Dispense: 6 tablet; Refill: 0    2. Cough, unspecified type  -     POCT SARS-CoV-2 Antigen JAVID + Flu  -     XR Chest PA & Lateral; Future  -     benzonatate (TESSALON) 200 MG capsule; Take 1 capsule by mouth 3 (Three) Times a Day As Needed for Cough.  Dispense: 21 capsule; Refill: 0    3. Wheezing  -     methylPREDNISolone (MEDROL) 4 MG dose pack; Take as directed on package instructions.  Dispense: 21 each; Refill: 0  -     albuterol sulfate  (90 Base) MCG/ACT inhaler; Inhale 2 puffs Every 4 (Four) Hours As Needed for Wheezing or Shortness of Air.  Dispense: 18 g; Refill: 0                             FOR FULL DISCHARGE INSTRUCTIONS/COMMENTS/HANDOUTS please see the   AVS

## 2023-09-20 ENCOUNTER — TELEPHONE (OUTPATIENT)
Dept: FAMILY MEDICINE CLINIC | Age: 58
End: 2023-09-20
Payer: COMMERCIAL

## 2023-09-20 DIAGNOSIS — E78.2 MIXED HYPERLIPIDEMIA: Primary | ICD-10-CM

## 2023-09-20 NOTE — TELEPHONE ENCOUNTER
----- Message from Christine Ramirez LPN sent at 6/20/2023 11:57 AM EDT -----  TICKLE for lipids, ALT in 90 days.

## 2023-09-22 ENCOUNTER — LAB (OUTPATIENT)
Dept: LAB | Facility: HOSPITAL | Age: 58
End: 2023-09-22
Payer: COMMERCIAL

## 2023-09-22 DIAGNOSIS — E78.2 MIXED HYPERLIPIDEMIA: ICD-10-CM

## 2023-09-22 LAB
ALT SERPL W P-5'-P-CCNC: 23 U/L (ref 1–41)
CHOLEST SERPL-MCNC: 155 MG/DL (ref 0–200)
HDLC SERPL-MCNC: 73 MG/DL (ref 40–60)
LDLC SERPL CALC-MCNC: 72 MG/DL (ref 0–100)
LDLC/HDLC SERPL: 1 {RATIO}
TRIGL SERPL-MCNC: 46 MG/DL (ref 0–150)
VLDLC SERPL-MCNC: 10 MG/DL (ref 5–40)

## 2023-09-22 PROCEDURE — 84460 ALANINE AMINO (ALT) (SGPT): CPT

## 2023-09-22 PROCEDURE — 80061 LIPID PANEL: CPT

## 2023-09-22 PROCEDURE — 36415 COLL VENOUS BLD VENIPUNCTURE: CPT

## 2023-12-15 ENCOUNTER — CLINICAL SUPPORT (OUTPATIENT)
Dept: FAMILY MEDICINE CLINIC | Age: 58
End: 2023-12-15
Payer: COMMERCIAL

## 2023-12-15 DIAGNOSIS — Z23 IMMUNIZATION DUE: Primary | ICD-10-CM

## 2023-12-15 PROCEDURE — 90480 ADMN SARSCOV2 VAC 1/ONLY CMP: CPT | Performed by: FAMILY MEDICINE

## 2023-12-15 PROCEDURE — 91320 SARSCV2 VAC 30MCG TRS-SUC IM: CPT | Performed by: FAMILY MEDICINE

## 2023-12-15 PROCEDURE — 90686 IIV4 VACC NO PRSV 0.5 ML IM: CPT | Performed by: FAMILY MEDICINE

## 2023-12-15 PROCEDURE — 90471 IMMUNIZATION ADMIN: CPT | Performed by: FAMILY MEDICINE

## 2023-12-28 ENCOUNTER — CLINICAL SUPPORT (OUTPATIENT)
Dept: FAMILY MEDICINE CLINIC | Age: 58
End: 2023-12-28
Payer: COMMERCIAL

## 2023-12-28 DIAGNOSIS — Z23 NEED FOR VACCINATION: Primary | ICD-10-CM

## 2024-07-02 ENCOUNTER — OFFICE VISIT (OUTPATIENT)
Dept: FAMILY MEDICINE CLINIC | Age: 59
End: 2024-07-02
Payer: COMMERCIAL

## 2024-07-02 VITALS
WEIGHT: 193.4 LBS | TEMPERATURE: 98.3 F | DIASTOLIC BLOOD PRESSURE: 66 MMHG | HEART RATE: 59 BPM | HEIGHT: 72 IN | SYSTOLIC BLOOD PRESSURE: 120 MMHG | OXYGEN SATURATION: 96 % | BODY MASS INDEX: 26.19 KG/M2

## 2024-07-02 DIAGNOSIS — E78.2 MIXED HYPERLIPIDEMIA: ICD-10-CM

## 2024-07-02 DIAGNOSIS — Z00.00 PHYSICAL EXAM: Primary | ICD-10-CM

## 2024-07-02 DIAGNOSIS — I10 ESSENTIAL HYPERTENSION: ICD-10-CM

## 2024-07-02 PROCEDURE — 99396 PREV VISIT EST AGE 40-64: CPT | Performed by: FAMILY MEDICINE

## 2024-07-02 RX ORDER — LISINOPRIL 20 MG/1
20 TABLET ORAL DAILY
Qty: 90 TABLET | Refills: 3 | Status: SHIPPED | OUTPATIENT
Start: 2024-07-02

## 2024-07-02 RX ORDER — ROSUVASTATIN CALCIUM 10 MG/1
10 TABLET, COATED ORAL DAILY
Qty: 90 TABLET | Refills: 3 | Status: SHIPPED | OUTPATIENT
Start: 2024-07-02

## 2024-07-02 NOTE — PROGRESS NOTES
John Shields presents to [facility].    Chief Complaint:  Annual physical    Subjective   History of Present Illness:  John is being seen today for annual physical.  He is 59 years old and has been retired for about 6 years.  He does not smoke and never has.  He does drink some beer.  [place]  He does not have concerns about how much he drinks.  He completed Cologuard last year which was negative.  He does see urology, and they are monitoring his PSA.    John also has hypertension and elevated cholesterol.  He remains on treatment for these issues.  His blood pressure is well-controlled today.  He is unaware of problems with the medications.    Review of Systems:  Review of Systems   Constitutional:  Positive for fatigue. Negative for chills and fever.   Respiratory:  Negative for cough and shortness of breath.    Cardiovascular:  Positive for palpitations. Negative for chest pain.   Gastrointestinal:  Negative for abdominal pain, nausea and vomiting.      Objective   Medical History:  Past Medical History:    Enlarged prostate    Essential hypertension     Past Surgical History:    COLONOSCOPY    Normal per history      Family History   Problem Relation Age of Onset    Hypertension Mother     Cancer Father         Rare spinal tumor    Diabetes Maternal Grandmother     Heart attack Maternal Grandmother     Heart disease Maternal Grandmother     Stroke Neg Hx      Social History     Tobacco Use    Smoking status: Never    Smokeless tobacco: Never   Substance Use Topics    Alcohol use: Yes     Alcohol/week: 10.0 standard drinks of alcohol     Types: 10 Cans of beer per week       Health Maintenance Due   Topic Date Due    TDAP/TD VACCINES (1 - Tdap) Never done    RSV Vaccine - Adults (1 - 1-dose 60+ series) Never done    BMI FOLLOWUP  06/19/2024        Immunization History   Administered Date(s) Administered    COVID-19 (MODERNA)  "1st,2nd,3rd Dose Monovalent 03/29/2021, 04/26/2021, 11/11/2021    COVID-19 (MODERNA) BIVALENT 12+YRS 10/13/2022    COVID-19 F23 (PFIZER) 12YRS+ (COMIRNATY) 12/15/2023    Flu Vaccine Quad PF >36MO 09/28/2018    Fluzone (or Fluarix & Flulaval for VFC) >6mos 10/02/2021, 12/15/2023    Influenza Injectable Mdck Pf Quad 10/13/2022    Pneumococcal Conjugate 20-Valent (PCV20) 12/28/2023    Shingrix 07/15/2022, 09/19/2022       No Known Allergies     Medications:  Current Outpatient Medications on File Prior to Visit   Medication Sig    tamsulosin (FLOMAX) 0.4 MG capsule 24 hr capsule Take 1 capsule by mouth Daily.    [DISCONTINUED] lisinopril (PRINIVIL,ZESTRIL) 20 MG tablet Take 1 tablet by mouth Daily.    [DISCONTINUED] rosuvastatin (Crestor) 10 MG tablet Take 1 tablet by mouth Daily.    [DISCONTINUED] albuterol sulfate  (90 Base) MCG/ACT inhaler Inhale 2 puffs Every 4 (Four) Hours As Needed for Wheezing or Shortness of Air.    [DISCONTINUED] azithromycin (Zithromax Z-Cristian) 250 MG tablet Take 2 tablets by mouth on day 1, then 1 tablet daily on days 2-5    [DISCONTINUED] benzonatate (TESSALON) 200 MG capsule Take 1 capsule by mouth 3 (Three) Times a Day As Needed for Cough.    [DISCONTINUED] methylPREDNISolone (MEDROL) 4 MG dose pack Take as directed on package instructions.     No current facility-administered medications on file prior to visit.       Vital Signs:   /66 (BP Location: Right arm, Patient Position: Sitting)   Pulse 59   Temp 98.3 °F (36.8 °C) (Oral)   Ht 182.9 cm (72.01\")   Wt 87.7 kg (193 lb 6.4 oz)   SpO2 96%   BMI 26.22 kg/m²       Physical Exam:  Physical Exam  Vitals and nursing note reviewed.   Constitutional:       General: He is not in acute distress.     Appearance: He is not ill-appearing.   HENT:      Right Ear: Tympanic membrane and ear canal normal.      Left Ear: Tympanic membrane and ear canal normal.      Mouth/Throat:      Mouth: Mucous membranes are moist.      Comments: " Pharynx appears normal  Eyes:      Extraocular Movements: Extraocular movements intact.      Pupils: Pupils are equal, round, and reactive to light.   Neck:      Thyroid: No thyromegaly.   Cardiovascular:      Rate and Rhythm: Normal rate and regular rhythm.      Heart sounds: No murmur heard.  Pulmonary:      Effort: Pulmonary effort is normal.      Breath sounds: Normal breath sounds.   Abdominal:      General: There is no distension.      Palpations: Abdomen is soft. There is no mass.      Tenderness: There is no abdominal tenderness.   Musculoskeletal:      Cervical back: Normal range of motion.   Skin:     Findings: No lesion or rash.   Neurological:      General: No focal deficit present.      Mental Status: He is oriented to person, place, and time.      Cranial Nerves: No cranial nerve deficit.   Psychiatric:         Mood and Affect: Mood normal.     Result Review   The following data was reviewed by Jose R Mayen MD on 07/02/2024.  Lab Results   Component Value Date    WBC 4.79 06/19/2023    HGB 15.3 06/19/2023    HCT 44.7 06/19/2023    MCV 87.1 06/19/2023     (L) 06/19/2023     Lab Results   Component Value Date    GLUCOSE 101 (H) 06/19/2023    BUN 16 06/19/2023    CREATININE 0.98 06/19/2023     06/19/2023    K 4.4 06/19/2023     06/19/2023    CO2 29.0 06/19/2023    CALCIUM 9.5 06/19/2023    PROTEINTOT 6.8 06/19/2023    ALBUMIN 4.6 06/19/2023    ALT 23 09/22/2023    AST 24 06/19/2023    ALKPHOS 55 06/19/2023    BILITOT 0.7 06/19/2023    EGFR 88.3 06/19/2023    GLOB 2.2 06/19/2023    AGRATIO 2.1 06/19/2023    BCR 16.3 06/19/2023    ANIONGAP 8.0 06/19/2023      Lab Results   Component Value Date    CHOL 155 09/22/2023    CHLPL 206 (H) 04/14/2021    TRIG 46 09/22/2023    HDL 73 (H) 09/22/2023    LDL 72 09/22/2023     Lab Results   Component Value Date    TSH 3.790 06/19/2023     Lab Results   Component Value Date    HGBA1C 5.60 06/19/2023     BMI is >= 25 and <30. (Overweight) The  following options were offered after discussion;: exercise counseling/recommendations and nutrition counseling/recommendations         Assessment and Plan:   Today, we have reviewed his care.  John is remains in excellent overall health.  Regarding the annual physical, he is currently up-to-date on recommended cancer screenings.  We reviewed vaccines today.  Regarding his usual care, we will refill his medications and ask him to stop back in for fasting labs at his convenience.  Tentative follow-up will be again in about a year, sooner if needed.    Diagnoses and all orders for this visit:    1. Physical exam (Primary)  -     CBC (No Diff); Future  -     Comprehensive Metabolic Panel; Future  -     Lipid Panel; Future  -     TSH; Future  -     Hemoglobin A1c; Future    2. Essential hypertension  -     lisinopril (PRINIVIL,ZESTRIL) 20 MG tablet; Take 1 tablet by mouth Daily.  Dispense: 90 tablet; Refill: 3    3. Mixed hyperlipidemia  -     rosuvastatin (Crestor) 10 MG tablet; Take 1 tablet by mouth Daily.  Dispense: 90 tablet; Refill: 3    Follow Up  Return in about 1 year (around 7/2/2025) for Recheck, Annual physical.  Patient was given instructions and counseling regarding his condition or for health maintenance advice. Please see specific information pulled into the AVS if appropriate.

## 2024-07-11 ENCOUNTER — LAB (OUTPATIENT)
Dept: LAB | Facility: HOSPITAL | Age: 59
End: 2024-07-11
Payer: COMMERCIAL

## 2024-07-11 DIAGNOSIS — Z00.00 PHYSICAL EXAM: ICD-10-CM

## 2024-07-11 LAB
ALBUMIN SERPL-MCNC: 4.5 G/DL (ref 3.5–5.2)
ALBUMIN/GLOB SERPL: 2 G/DL
ALP SERPL-CCNC: 56 U/L (ref 39–117)
ALT SERPL W P-5'-P-CCNC: 24 U/L (ref 1–41)
ANION GAP SERPL CALCULATED.3IONS-SCNC: 9 MMOL/L (ref 5–15)
AST SERPL-CCNC: 24 U/L (ref 1–40)
BILIRUB SERPL-MCNC: 0.4 MG/DL (ref 0–1.2)
BUN SERPL-MCNC: 18 MG/DL (ref 8–23)
BUN/CREAT SERPL: 17.3 (ref 7–25)
CALCIUM SPEC-SCNC: 9.8 MG/DL (ref 8.6–10.5)
CHLORIDE SERPL-SCNC: 102 MMOL/L (ref 98–107)
CHOLEST SERPL-MCNC: 164 MG/DL (ref 0–200)
CO2 SERPL-SCNC: 27 MMOL/L (ref 22–29)
CREAT SERPL-MCNC: 1.04 MG/DL (ref 0.76–1.27)
DEPRECATED RDW RBC AUTO: 40.3 FL (ref 37–54)
EGFRCR SERPLBLD CKD-EPI 2021: 81.7 ML/MIN/1.73
ERYTHROCYTE [DISTWIDTH] IN BLOOD BY AUTOMATED COUNT: 12.3 % (ref 12.3–15.4)
GLOBULIN UR ELPH-MCNC: 2.3 GM/DL
GLUCOSE SERPL-MCNC: 107 MG/DL (ref 65–99)
HBA1C MFR BLD: 5.7 % (ref 4.8–5.6)
HCT VFR BLD AUTO: 46 % (ref 37.5–51)
HDLC SERPL-MCNC: 65 MG/DL (ref 40–60)
HGB BLD-MCNC: 15.2 G/DL (ref 13–17.7)
LDLC SERPL CALC-MCNC: 87 MG/DL (ref 0–100)
LDLC/HDLC SERPL: 1.34 {RATIO}
MCH RBC QN AUTO: 29.6 PG (ref 26.6–33)
MCHC RBC AUTO-ENTMCNC: 33 G/DL (ref 31.5–35.7)
MCV RBC AUTO: 89.5 FL (ref 79–97)
PLATELET # BLD AUTO: 143 10*3/MM3 (ref 140–450)
PMV BLD AUTO: 11.5 FL (ref 6–12)
POTASSIUM SERPL-SCNC: 4.3 MMOL/L (ref 3.5–5.2)
PROT SERPL-MCNC: 6.8 G/DL (ref 6–8.5)
RBC # BLD AUTO: 5.14 10*6/MM3 (ref 4.14–5.8)
SODIUM SERPL-SCNC: 138 MMOL/L (ref 136–145)
TRIGL SERPL-MCNC: 61 MG/DL (ref 0–150)
TSH SERPL DL<=0.05 MIU/L-ACNC: 4.31 UIU/ML (ref 0.27–4.2)
VLDLC SERPL-MCNC: 12 MG/DL (ref 5–40)
WBC NRBC COR # BLD AUTO: 5.66 10*3/MM3 (ref 3.4–10.8)

## 2024-07-11 PROCEDURE — 80061 LIPID PANEL: CPT

## 2024-07-11 PROCEDURE — 83036 HEMOGLOBIN GLYCOSYLATED A1C: CPT

## 2024-07-11 PROCEDURE — 36415 COLL VENOUS BLD VENIPUNCTURE: CPT

## 2024-07-11 PROCEDURE — 80050 GENERAL HEALTH PANEL: CPT

## 2024-10-01 ENCOUNTER — CLINICAL SUPPORT (OUTPATIENT)
Dept: FAMILY MEDICINE CLINIC | Age: 59
End: 2024-10-01
Payer: COMMERCIAL

## 2024-10-01 DIAGNOSIS — Z23 IMMUNIZATION DUE: Primary | ICD-10-CM

## 2024-10-01 PROCEDURE — 90471 IMMUNIZATION ADMIN: CPT | Performed by: FAMILY MEDICINE

## 2024-10-01 PROCEDURE — 91320 SARSCV2 VAC 30MCG TRS-SUC IM: CPT | Performed by: FAMILY MEDICINE

## 2024-10-01 PROCEDURE — 90656 IIV3 VACC NO PRSV 0.5 ML IM: CPT | Performed by: FAMILY MEDICINE

## 2024-10-01 PROCEDURE — 90480 ADMN SARSCOV2 VAC 1/ONLY CMP: CPT | Performed by: FAMILY MEDICINE

## 2024-12-19 ENCOUNTER — TELEPHONE (OUTPATIENT)
Dept: FAMILY MEDICINE CLINIC | Age: 59
End: 2024-12-19
Payer: COMMERCIAL

## 2024-12-19 DIAGNOSIS — R06.83 SNORING: Primary | ICD-10-CM

## 2024-12-19 NOTE — TELEPHONE ENCOUNTER
Caller: John Shields    Relationship: Self    Best call back number: [phone]     What is the medical concern/diagnosis: SNORING    What specialty or service is being requested: SLEEP CENTER    What is the provider, practice or medical service name: PROVIDER RECOMMENDATION    What is the office location: [place] [facility]    Any additional details: REGISTRATION UPDATED.

## 2025-01-16 ENCOUNTER — OFFICE VISIT (OUTPATIENT)
Dept: SLEEP MEDICINE | Facility: HOSPITAL | Age: 60
End: 2025-01-16
Payer: COMMERCIAL

## 2025-01-16 VITALS
OXYGEN SATURATION: 100 % | HEIGHT: 72 IN | SYSTOLIC BLOOD PRESSURE: 142 MMHG | WEIGHT: 195 LBS | DIASTOLIC BLOOD PRESSURE: 86 MMHG | HEART RATE: 74 BPM | BODY MASS INDEX: 26.41 KG/M2

## 2025-01-16 DIAGNOSIS — G47.10 HYPERSOMNIA: Primary | ICD-10-CM

## 2025-01-16 PROCEDURE — G0463 HOSPITAL OUTPT CLINIC VISIT: HCPCS

## 2025-01-16 NOTE — PROGRESS NOTES
CONSULT NOTE    Patient Identification:  John Shields  59 y.o.  male  1965  [id number]            Requesting physician: Jose R Mayen MD    Reason for Consultation: Snoring hypersomnia    CC:     History of Present Illness:  Very pleasant 59-year-old gentleman reports increased arousal at night with dry mouth and headache.  Patient does report snoring with witnessed apnea.  He also has gasping in his sleep.  Feels unrested in the morning sleepy tired as the day progresses.  Drinks alcohol up to 10 beers weekly.  He does drink alcohol close to bedtime also.  He also has nocturia attributed to a weak bladder.  Goes to bed 9 gets up 6 gets about 8+ hours of sleep and feels tired in the morning.  Weight gain of 10 pounds reported.  Also reports morning headache with dry mouth and sweating in his sleep.  [place] 2 out of 24 within normal limits    Review of Systems  Positive for cough difficulty in urination.  Rest of the 12 point review of system negative    Past Medical History:  Past Medical History:   Diagnosis Date    Enlarged prostate     Essential hypertension     Malignant melanoma        Past Surgical History:  Past Surgical History:   Procedure Laterality Date    COLONOSCOPY  2013    Normal per history    WIDE EXCISION LESION/MASS TRUNK  08/05/2022    Melanoma        Home Meds:  (Not in a hospital admission)      Allergies:  No Known Allergies    Social History:   Social History     Socioeconomic History    Marital status:      Spouse name: Geraldine    Number of children: 3   Tobacco Use    Smoking status: Never    Smokeless tobacco: Never   Vaping Use    Vaping status: Never Used   Substance and Sexual Activity    Alcohol use: Yes     Alcohol/week: 10.0 standard drinks of alcohol     Types: 10 Cans of beer per week    Drug use: Never    Sexual activity: Not Currently     Partners: Female     Birth control/protection: None       Family History:  Family History   Problem Relation Age of Onset     "Hypertension Mother     Cancer Father         Rare spinal tumor    Diabetes Maternal Grandmother     Heart attack Maternal Grandmother     Heart disease Maternal Grandmother     Stroke Neg Hx        Physical Exam:  /86 (BP Location: Left arm, Patient Position: Sitting)   Pulse 74   Ht 182.9 cm (72.01\")   Wt 88.5 kg (195 lb)   SpO2 100%   BMI 26.44 kg/m²  Body mass index is 26.44 kg/m². 100% 88.5 kg (195 lb)  Physical Exam  Patient is examined using the personal protective equipment as per guidelines from infection control for this particular patient as enacted.  Hand hygiene was performed before and after patient interaction.  Well-developed normal body habitus  Eyes normal conjunctivae and pupils reactive to light  ENT Mallampati between 3 and 4 normal nasal exam  Neck midline trachea no thyromegaly  Chest no labored breathing clear  CVS regular rate and rhythm no lower extremity edema  Abdomen soft nontender no hepatosplenomegaly  CNS intact normal sensory exam  Skin no rashes no nodules  Psych oriented to time place and person normal memory  Musculoskeletal no cyanosis no clubbing normal range of motion    LABS:  Lab Results   Component Value Date    CALCIUM 9.8 07/11/2024       No results found for: \"CKTOTAL\", \"CKMB\", \"CKMBINDEX\", \"TROPONINI\", \"TROPONINT\"                              Lab Results   Component Value Date    TSH 4.310 (H) 07/11/2024     CrCl cannot be calculated (Patient's most recent lab result is older than the maximum 30 days allowed.).         Imaging: I personally visualized the images of scans/x-rays performed within last 3 days.      Assessment:  Apnea  EtOH daily intake  Hypertension  Nocturia      Recommendations:  At this time we have a middle-age gentleman with clinical symptoms and airway anatomy suggestive of sleep disordered breathing  Discussed pathophysiology consequence of untreated sleep apnea  I suspect his daily EtOH intake is making his LARRY worse  Sleep hygiene " measures discussed in detail  Treatment of underlying comorbidities  Advised patient to reduce alcohol intake specially at bedtime  Correlation between current symptoms and LARRY was also discussed in detail   We will make further recommendations after reviewing the home sleep study              Hector Wellington MD  1/16/2025  09:08 EST      Much of this encounter note is an electronic transcription/translation of spoken language to printed text using Dragon Software.

## 2025-01-23 ENCOUNTER — HOSPITAL ENCOUNTER (OUTPATIENT)
Dept: SLEEP MEDICINE | Facility: HOSPITAL | Age: 60
Discharge: HOME | End: 2025-01-23
Admitting: INTERNAL MEDICINE
Payer: COMMERCIAL

## 2025-01-23 DIAGNOSIS — G47.10 HYPERSOMNIA: ICD-10-CM

## 2025-01-23 PROCEDURE — G0399 HOME SLEEP TEST/TYPE 3 PORTA: HCPCS

## 2025-01-30 ENCOUNTER — TELEPHONE (OUTPATIENT)
Dept: SLEEP MEDICINE | Facility: HOSPITAL | Age: 60
End: 2025-01-30
Payer: COMMERCIAL

## 2025-02-07 ENCOUNTER — TELEPHONE (OUTPATIENT)
Dept: FAMILY MEDICINE CLINIC | Age: 60
End: 2025-02-07
Payer: COMMERCIAL

## 2025-02-07 DIAGNOSIS — M10.9 GOUT, UNSPECIFIED CAUSE, UNSPECIFIED CHRONICITY, UNSPECIFIED SITE: Primary | ICD-10-CM

## 2025-02-07 RX ORDER — DICLOFENAC SODIUM 75 MG/1
75 TABLET, DELAYED RELEASE ORAL 2 TIMES DAILY
Qty: 40 TABLET | Refills: 1 | Status: SHIPPED | OUTPATIENT
Start: 2025-02-07

## 2025-02-07 RX ORDER — COLCHICINE 0.6 MG/1
TABLET ORAL
Qty: 3 TABLET | Refills: 0 | Status: SHIPPED | OUTPATIENT
Start: 2025-02-07

## 2025-02-07 NOTE — TELEPHONE ENCOUNTER
Pt states he has gout flare up in his foot again. He would like to know if you can send rx in to pharmacy. Please advise.

## 2025-02-07 NOTE — TELEPHONE ENCOUNTER
Please let John know that I have sent prescriptions for colchicine and diclofenac to [facility] just now.  Colchicine will have very specific directions and is only to be used today.  It can cause upset stomach and some patients.  Diclofenac is an NSAID similar to ibuprofen.  He should not take ibuprofen or naproxen with this, and he should take it with food.  There is a risk of interaction with lisinopril impacting kidney function, but I do not anticipate him taking it very long.  If his symptoms do not improve over the weekend, we will need to work him in early next week.  Thanks.

## 2025-02-12 ENCOUNTER — TELEPHONE (OUTPATIENT)
Dept: FAMILY MEDICINE CLINIC | Age: 60
End: 2025-02-12
Payer: COMMERCIAL

## 2025-02-12 NOTE — TELEPHONE ENCOUNTER
Caller: Cornelius John    Relationship: Self    Best call back number: [phone]     What is the best time to reach you: ANYTIME     Who are you requesting to speak with (clinical staff, provider,  specific staff member): CLINICAL     What was the call regarding: PATIENT IS CALLING TO CONFIRM IF NEEDING TO COMPLETE THE EXACT 20 DAY SUPPLY FOR diclofenac (VOLTAREN) 75 MG EC tablet , FOR GOUT FLARE UP.

## 2025-04-17 ENCOUNTER — OFFICE VISIT (OUTPATIENT)
Dept: SLEEP MEDICINE | Facility: HOSPITAL | Age: 60
End: 2025-04-17
Payer: COMMERCIAL

## 2025-04-17 VITALS
HEIGHT: 72 IN | OXYGEN SATURATION: 99 % | WEIGHT: 190 LBS | BODY MASS INDEX: 25.73 KG/M2 | HEART RATE: 55 BPM | SYSTOLIC BLOOD PRESSURE: 132 MMHG | DIASTOLIC BLOOD PRESSURE: 70 MMHG

## 2025-04-17 DIAGNOSIS — G47.33 OSA (OBSTRUCTIVE SLEEP APNEA): Primary | ICD-10-CM

## 2025-04-17 PROCEDURE — G0463 HOSPITAL OUTPT CLINIC VISIT: HCPCS

## 2025-04-17 NOTE — PROGRESS NOTES
"      [facility]         Dr Hector Wellington  [unfilled]  Patient Care Team:  Jose R Mayen MD as PCP - General (Family Medicine)    Chief Complaint:Mild LARRY with AHI 10.6 events per hour  Sleep-related hypoxemia minimal  Patient snored throughout the study    Interval History: Patient here for follow-up after set up.  Currently on CPAP 5 to 15 cm.  Compliance 100% average daily use 8 hours 12 minutes.  AHI leak within normal limits.  Goes to bed 9 gets up 6 gets about 7+ hours of sleep more rested with CPAP.  No tobacco abuse no caffeine abuse alcoholic beverages 10 daily.  Currently has a fullface mask that fits well.  Supplies been adequate.  [place] 2 out of 24 within normal limits.    REVIEW OF SYSTEMS:   CARDIOVASCULAR: No chest pain, chest pressure or chest discomfort. No palpitations or edema.   RESPIRATORY: No shortness of breath, cough or sputum.   GASTROINTESTINAL: No anorexia, nausea, vomiting or diarrhea. No abdominal pain or blood.   HEMATOLOGIC: No bleeding or bruising.     Ventilator/Non-Invasive Ventilation Settings (From admission, onward)      None              Vital Signs  Heart Rate:  [55] 55  BP: (132)/(70) 132/70  [unfilled]  Flowsheet Rows      Flowsheet Row First Filed Value   Admission Height 182.9 cm (72.01\") Documented at 04/17/2025 1500   Admission Weight 86.2 kg (190 lb) Documented at 04/17/2025 1500                  Physical Exam:  Patient is examined using the personal protective equipment as per guidelines from infection control for this particular patient as enacted.  Hand hygiene was performed before and after patient interaction.   General Appearance:    Alert, cooperative, in no acute distress.  Following simple commands  ENT Mallampati between 3 and 4 no nasal congestion  Neck midline trachea, no thyromegaly   Lungs:     Clear to auscultation, respirations regular, even and                  unlabored    Heart:    Regular rhythm and normal rate, normal S1 and " S2, no            murmur, no gallop, no rub, no click   Chest Wall:    No abnormalities observed   Abdomen:     Normal bowel sounds, no masses, no organomegaly, soft        nontender, nondistended, no guarding, no rebound                tenderness   Extremities:   Moves all extremities well, no edema, no cyanosis, no             redness  CNS no focal neurological deficits normal sensory exam  Skin no rashes no nodules  Musculoskeletal no cyanosis no clubbing normal range of motion     Results Review:                                          I reviewed the patient's new clinical results.  I personally viewed and interpreted the patient's chest x-ray.        Medication Review:       No current facility-administered medications for this visit.      ASSESSMENT:   LARRY on CPAP   Continue current  EtOH daily intake  Hypertension  Nocturia    PLAN:  Reviewed compliance download with the patient  Compliance AHI leak look excellent  Continue current auto CPAP 5 to 15 cm  Sleep hygiene measures  Treatment of underlying comorbidities  Nocturia has improved significantly  Reduce alcohol intake especially at bedtime  We will follow-up in 1 year      Hector Wellington MD  04/17/25  16:00 EDT

## 2025-06-10 ENCOUNTER — TELEPHONE (OUTPATIENT)
Dept: FAMILY MEDICINE CLINIC | Age: 60
End: 2025-06-10
Payer: COMMERCIAL

## 2025-06-10 DIAGNOSIS — M10.9 GOUT, UNSPECIFIED CAUSE, UNSPECIFIED CHRONICITY, UNSPECIFIED SITE: ICD-10-CM

## 2025-06-10 DIAGNOSIS — Z12.5 PROSTATE CANCER SCREENING: ICD-10-CM

## 2025-06-10 DIAGNOSIS — I10 ESSENTIAL HYPERTENSION: ICD-10-CM

## 2025-06-10 DIAGNOSIS — Z00.00 PHYSICAL EXAM: Primary | ICD-10-CM

## 2025-06-10 DIAGNOSIS — E78.2 MIXED HYPERLIPIDEMIA: ICD-10-CM

## 2025-06-10 RX ORDER — ROSUVASTATIN CALCIUM 10 MG/1
10 TABLET, COATED ORAL DAILY
Qty: 90 TABLET | Refills: 0 | Status: SHIPPED | OUTPATIENT
Start: 2025-06-10

## 2025-06-10 RX ORDER — LISINOPRIL 20 MG/1
20 TABLET ORAL DAILY
Qty: 90 TABLET | Refills: 0 | Status: SHIPPED | OUTPATIENT
Start: 2025-06-10

## 2025-06-10 NOTE — TELEPHONE ENCOUNTER
Please let John know that I have placed lab orders for him to complete at his convenience.  He should be fasting for these labs, but I am ordering a urine test.  Therefore, he should have plenty of black coffee or water before he comes.  Also, confirm that he sees urology or someone else for prostate check including PSA.  I did not order that, but I can if needed.  In addition, I sent another 90-day refill on his medications, lisinopril and rosuvastatin.  I would recommend he see me in the next 90 days for a 15-minute annual physical.  I would prefer not to wait until February.  Thanks.

## 2025-06-10 NOTE — TELEPHONE ENCOUNTER
Called patient to Reschedule physical. R/s 'ed for Feb 2026. Patient asked if there was any way he can just have some lab orders sent in and the doctor just review them if any issues, or is it ok to wait until Feb 2026 for physical appt. Please advise. Thank you

## 2025-07-09 ENCOUNTER — TELEPHONE (OUTPATIENT)
Dept: FAMILY MEDICINE CLINIC | Age: 60
End: 2025-07-09
Payer: COMMERCIAL

## 2025-07-21 ENCOUNTER — LAB (OUTPATIENT)
Dept: LAB | Facility: HOSPITAL | Age: 60
End: 2025-07-21
Payer: COMMERCIAL

## 2025-07-21 DIAGNOSIS — Z00.00 PHYSICAL EXAM: ICD-10-CM

## 2025-07-21 DIAGNOSIS — I10 ESSENTIAL HYPERTENSION: ICD-10-CM

## 2025-07-21 DIAGNOSIS — M10.9 GOUT, UNSPECIFIED CAUSE, UNSPECIFIED CHRONICITY, UNSPECIFIED SITE: ICD-10-CM

## 2025-07-21 DIAGNOSIS — E78.2 MIXED HYPERLIPIDEMIA: ICD-10-CM

## 2025-07-21 LAB
ALBUMIN SERPL-MCNC: 4.4 G/DL (ref 3.5–5.2)
ALBUMIN/GLOB SERPL: 1.7 G/DL
ALP SERPL-CCNC: 63 U/L (ref 39–117)
ALT SERPL W P-5'-P-CCNC: 19 U/L (ref 1–41)
ANION GAP SERPL CALCULATED.3IONS-SCNC: 10 MMOL/L (ref 5–15)
AST SERPL-CCNC: 23 U/L (ref 1–40)
BACTERIA UR QL AUTO: NORMAL /HPF
BILIRUB SERPL-MCNC: 0.4 MG/DL (ref 0–1.2)
BILIRUB UR QL STRIP: NEGATIVE
BUN SERPL-MCNC: 19 MG/DL (ref 8–23)
BUN/CREAT SERPL: 19.6 (ref 7–25)
CALCIUM SPEC-SCNC: 9.8 MG/DL (ref 8.6–10.5)
CHLORIDE SERPL-SCNC: 103 MMOL/L (ref 98–107)
CHOLEST SERPL-MCNC: 150 MG/DL (ref 0–200)
CLARITY UR: CLEAR
CO2 SERPL-SCNC: 26 MMOL/L (ref 22–29)
COLOR UR: NORMAL
CREAT SERPL-MCNC: 0.97 MG/DL (ref 0.76–1.27)
DEPRECATED RDW RBC AUTO: 41.3 FL (ref 37–54)
EGFRCR SERPLBLD CKD-EPI 2021: 88.3 ML/MIN/1.73
ERYTHROCYTE [DISTWIDTH] IN BLOOD BY AUTOMATED COUNT: 12.5 % (ref 12.3–15.4)
GLOBULIN UR ELPH-MCNC: 2.6 GM/DL
GLUCOSE SERPL-MCNC: 122 MG/DL (ref 65–99)
GLUCOSE UR STRIP-MCNC: NEGATIVE MG/DL
HBA1C MFR BLD: 5.7 % (ref 4.8–5.6)
HCT VFR BLD AUTO: 43.2 % (ref 37.5–51)
HDLC SERPL-MCNC: 59 MG/DL (ref 40–60)
HGB BLD-MCNC: 14.5 G/DL (ref 13–17.7)
HGB UR QL STRIP.AUTO: NEGATIVE
KETONES UR QL STRIP: NEGATIVE
LDLC SERPL CALC-MCNC: 82 MG/DL (ref 0–100)
LDLC/HDLC SERPL: 1.41 {RATIO}
LEUKOCYTE ESTERASE UR QL STRIP.AUTO: NEGATIVE
MCH RBC QN AUTO: 29.8 PG (ref 26.6–33)
MCHC RBC AUTO-ENTMCNC: 33.6 G/DL (ref 31.5–35.7)
MCV RBC AUTO: 88.9 FL (ref 79–97)
NITRITE UR QL STRIP: NEGATIVE
PH UR STRIP.AUTO: 7 [PH] (ref 5–8)
PLATELET # BLD AUTO: 123 10*3/MM3 (ref 140–450)
PMV BLD AUTO: 11.1 FL (ref 6–12)
POTASSIUM SERPL-SCNC: 4.4 MMOL/L (ref 3.5–5.2)
PROT SERPL-MCNC: 7 G/DL (ref 6–8.5)
PROT UR QL STRIP: NEGATIVE
RBC # BLD AUTO: 4.86 10*6/MM3 (ref 4.14–5.8)
RBC # UR STRIP: NORMAL /HPF
REF LAB TEST METHOD: NORMAL
SODIUM SERPL-SCNC: 139 MMOL/L (ref 136–145)
SP GR UR STRIP: 1.01 (ref 1–1.03)
SQUAMOUS #/AREA URNS HPF: NORMAL /HPF
TRIGL SERPL-MCNC: 40 MG/DL (ref 0–150)
TSH SERPL DL<=0.05 MIU/L-ACNC: 3.55 UIU/ML (ref 0.27–4.2)
URATE SERPL-MCNC: 7 MG/DL (ref 3.4–7)
UROBILINOGEN UR QL STRIP: NORMAL
VLDLC SERPL-MCNC: 9 MG/DL (ref 5–40)
WBC # UR STRIP: NORMAL /HPF
WBC NRBC COR # BLD AUTO: 5.2 10*3/MM3 (ref 3.4–10.8)

## 2025-07-21 PROCEDURE — 84550 ASSAY OF BLOOD/URIC ACID: CPT

## 2025-07-21 PROCEDURE — 81001 URINALYSIS AUTO W/SCOPE: CPT

## 2025-07-21 PROCEDURE — 83036 HEMOGLOBIN GLYCOSYLATED A1C: CPT

## 2025-07-21 PROCEDURE — 36415 COLL VENOUS BLD VENIPUNCTURE: CPT

## 2025-07-21 PROCEDURE — 80050 GENERAL HEALTH PANEL: CPT

## 2025-07-21 PROCEDURE — 80061 LIPID PANEL: CPT
